# Patient Record
Sex: MALE | Race: WHITE | NOT HISPANIC OR LATINO | URBAN - METROPOLITAN AREA
[De-identification: names, ages, dates, MRNs, and addresses within clinical notes are randomized per-mention and may not be internally consistent; named-entity substitution may affect disease eponyms.]

---

## 2021-08-09 ENCOUNTER — OUTPATIENT (OUTPATIENT)
Dept: OUTPATIENT SERVICES | Facility: HOSPITAL | Age: 71
LOS: 1 days | Discharge: HOME | End: 2021-08-09

## 2021-08-09 DIAGNOSIS — Z11.59 ENCOUNTER FOR SCREENING FOR OTHER VIRAL DISEASES: ICD-10-CM

## 2021-08-12 ENCOUNTER — OUTPATIENT (OUTPATIENT)
Dept: OUTPATIENT SERVICES | Facility: HOSPITAL | Age: 71
LOS: 1 days | Discharge: HOME | End: 2021-08-12

## 2021-08-12 DIAGNOSIS — Z95.1 PRESENCE OF AORTOCORONARY BYPASS GRAFT: Chronic | ICD-10-CM

## 2021-08-12 DIAGNOSIS — Z86.69 PERSONAL HISTORY OF OTHER DISEASES OF THE NERVOUS SYSTEM AND SENSE ORGANS: Chronic | ICD-10-CM

## 2021-08-12 LAB
ANION GAP SERPL CALC-SCNC: 13 MMOL/L — SIGNIFICANT CHANGE UP (ref 7–14)
BUN SERPL-MCNC: 29 MG/DL — HIGH (ref 10–20)
CALCIUM SERPL-MCNC: 10.1 MG/DL — SIGNIFICANT CHANGE UP (ref 8.5–10.1)
CHLORIDE SERPL-SCNC: 103 MMOL/L — SIGNIFICANT CHANGE UP (ref 98–110)
CO2 SERPL-SCNC: 26 MMOL/L — SIGNIFICANT CHANGE UP (ref 17–32)
CREAT SERPL-MCNC: 1.3 MG/DL — SIGNIFICANT CHANGE UP (ref 0.7–1.5)
GLUCOSE BLDC GLUCOMTR-MCNC: 125 MG/DL — HIGH (ref 70–99)
GLUCOSE SERPL-MCNC: 128 MG/DL — HIGH (ref 70–99)
HCT VFR BLD CALC: 38.6 % — LOW (ref 42–52)
HGB BLD-MCNC: 12.4 G/DL — LOW (ref 14–18)
MCHC RBC-ENTMCNC: 27.6 PG — SIGNIFICANT CHANGE UP (ref 27–31)
MCHC RBC-ENTMCNC: 32.1 G/DL — SIGNIFICANT CHANGE UP (ref 32–37)
MCV RBC AUTO: 86 FL — SIGNIFICANT CHANGE UP (ref 80–94)
NRBC # BLD: 0 /100 WBCS — SIGNIFICANT CHANGE UP (ref 0–0)
PLATELET # BLD AUTO: 195 K/UL — SIGNIFICANT CHANGE UP (ref 130–400)
POTASSIUM SERPL-MCNC: 4.4 MMOL/L — SIGNIFICANT CHANGE UP (ref 3.5–5)
POTASSIUM SERPL-SCNC: 4.4 MMOL/L — SIGNIFICANT CHANGE UP (ref 3.5–5)
RBC # BLD: 4.49 M/UL — LOW (ref 4.7–6.1)
RBC # FLD: 16.3 % — HIGH (ref 11.5–14.5)
SODIUM SERPL-SCNC: 142 MMOL/L — SIGNIFICANT CHANGE UP (ref 135–146)
WBC # BLD: 5.96 K/UL — SIGNIFICANT CHANGE UP (ref 4.8–10.8)
WBC # FLD AUTO: 5.96 K/UL — SIGNIFICANT CHANGE UP (ref 4.8–10.8)

## 2021-08-12 NOTE — ASU PATIENT PROFILE, ADULT - NSICDXPASTMEDICALHX_GEN_ALL_CORE_FT
PAST MEDICAL HISTORY:  Hypertension Diabetes   CABG  Tricia Neuropathy  Hyperlipidemia   Hypertension  Myocardial Infarction

## 2021-08-12 NOTE — CHART NOTE - NSCHARTNOTEFT_GEN_A_CORE
Preliminary Cardiac Catheterization Post-Procedure Report    PRE-OP DIAGNOSIS: abnormal stress test    PROCEDURE: Coronary angiogram, graft angiography    Attending: Dr. Rosario  Fellow: Dr. Drummond, Dr. Taylor     ANESTHESIA TYPE  [  ]General Anesthesia  [ x ] Sedation  [ x ] Local/Regional    ESTIMATED BLOOD LOSS:   < 10 mL    CONDITION  [  ] Critical  [  ] Serious  [  ]Fair  [ x ]Good    ACCESS & HEMOSTASIS  [  ] Right radial   [  ] Right femoral  [ x ] Left radial ->  Dstat  [  ] Left femoral       FINDINGS    Coronary circulation: There was significant 3-vessel coronary artery disease. Left main: Angiography showed mild atherosclerosis with no flow limiting lesions. Proximal LAD: Angiography showed severe atherosclerosis. Mid LAD: There was a 100 % stenosis. This lesion is a chronic total occlusion. Distal LAD: Angiography showed mild atherosclerosis with no flow limiting lesions. The artery was supplied by a patent LIMA graft. Patent prior stent distal to the anastomosis. 1st diagonal: Angiography showed mild atherosclerosis with no flow limiting lesions. The artery was supplied by retrograde flow from LIMA graft. Proximal circumflex: There was a 100 % stenosis at the ostium of the vessel segment. This lesion is a chronic total occlusion. 1st obtuse marginal: The artery was supplied by a patent bypass graft. Patent vessel distal to the graft anastomosis. Proximal RCA: There was a 100 % stenosis. This lesion is a chronic total occlusion. Graft to the LAD: The graft was a large sized SIMMONS. Graft angiography showed no evidence of disease. Graft to the 1st obtuse marginal: The graft was a large sized saphenous vein graft from the ascending aorta. Graft angiography showed no evidence of disease.     PROCEDURE SUMMARY  There is significant triple vessel coronary artery disease.  Patent LIMA graft to LAD. Patent prior stent in LAD distal to the LIMA anastomosis.  Patent SVG to OM.       RECOMMENDATIONS    -IV hydration post procedure   -Aggressive medical therapy and risk factor modification  -Out patient follow up with cardiology

## 2021-08-12 NOTE — H&P CARDIOLOGY - NSICDXPASTMEDICALHX_GEN_ALL_CORE_FT
PAST MEDICAL HISTORY:  CAD (coronary artery disease) s/p PCI x1 2012, MI x 2 (pt unsure of date)    DM (diabetes mellitus)     Hypertension Diabetes   CABG  Tricia Neuropathy  Hyperlipidemia   Hypertension  Myocardial Infarction    Liver cirrhosis

## 2021-08-12 NOTE — H&P CARDIOLOGY - HISTORY OF PRESENT ILLNESS
Patient is a 71y Male PMH: HTN, HLD, MI x 2 (pt unsure of date)DM, CAD s/p  PCI x 1 2012, DM, neuropathy, stent L leg ,                                   PSH: CABG x 2 7/2003(SIMMONS-LAD), carpal tunnel sx , knee sx   Pt reports SOB and CP with activity over past few months, had abnormal nuclear stress test revealing inferolateral defect, [facility] recommended     Vital Signs Last 24 Hrs  T(C): --  T(F): --  HR: --73  BP: --133/64  BP(mean): --89  RR: --  SpO2: --94% RA    Pre cath note:  indication:  [ ] STEMI                [ ] NSTEMI                 [ ] Acute coronary syndrome                   [ ]Unstable Angina   [ ] high risk  [ ] intermediate risk  [ ] low risk                   [ x] Stable Angina     non-invasive testing:         nuc stress test                 Date:      7/23/21               result: [ ] high risk  [ x] intermediate risk  [ ] low risk    Anti- Anginal medications:                    [ ] not used                       [x ] used                   [ ] not used but strong indication not to use    Ejection Fraction                   [ ] <29            [ ] 30-39%   [ ] 40-49%     [ ]>50%    CHF                   [ ] active (within last 14 days on meds   [ ] Chronic (on meds but no exacerbation)    COPD                   [ ] mild (on chronic bronchodilators)  [ ] moderate (on chronic steroid therapy)      [ ] severe (indication for home O2 or PACO2 >50)    Other risk factors:                     [ x] Previous MI                     [ ] CVA/ stroke                    [ ] carotid stent/ CEA                    [ ] PVD/PAD- (arterial aneurysm, non-palpable pulses, tortuous vessel with inability to insert catheter, infra-renal dissection, renal or subclavian artery stenosis)                    [x ] diabetic                    [ ] previous CABG                    [ ] Renal Failure     Bleeding Risk: 1.2%                          12.4   5.96  )-----------( 195      ( 12 Aug 2021 07:30 )             38.6             REVIEW OF SYSTEMS:  CONSTITUTIONAL: No fever, weight loss, or fatigue  CARDIOLOGY: PAtient denies chest pain, shortness of breath or syncopal episodes.   RESPIRATORY: denies shortness of breath, wheezing  NEUROLOGICAL: NO weakness, no focal deficits to report.  ENDOCRINOLOGICAL: no recent change in diabetic medications.   GI: no BRBPR, no N,V,diarrhea.     PHYSICAL EXAM:  · CONSTITUTIONAL:	Well-developed, well nourished    ·RESPIRATORY:   airway patent; breath sounds equal; good air movement; respirations non-labored; clear to auscultation bilaterally; no chest wall tenderness; no intercostal retractions; no rales,rhonchi or wheeze  · CARDIOVASCULAR	regular rate and rhythm  no rub  no murmur  normal PMI  · EXTREMITIES: No cyanosis, clubbing or edema, B/l toe ulcers(dressing C/D/I)  · VASCULAR: 	Equal and normal pulses (carotid, femoral, dorsalis pedis)  	        EKG: S. Stefan

## 2021-08-12 NOTE — H&P CARDIOLOGY - NSICDXPASTSURGICALHX_GEN_ALL_CORE_FT
PAST SURGICAL HISTORY:  H/O carpal tunnel syndrome Knee Surgery    S/P CABG x 2 7/2003 @ [facility](LIMA-LAD, SVG to RCA)

## 2021-08-17 DIAGNOSIS — E78.5 HYPERLIPIDEMIA, UNSPECIFIED: ICD-10-CM

## 2021-08-17 DIAGNOSIS — R94.39 ABNORMAL RESULT OF OTHER CARDIOVASCULAR FUNCTION STUDY: ICD-10-CM

## 2021-08-17 DIAGNOSIS — I25.2 OLD MYOCARDIAL INFARCTION: ICD-10-CM

## 2021-08-17 DIAGNOSIS — I25.10 ATHEROSCLEROTIC HEART DISEASE OF NATIVE CORONARY ARTERY WITHOUT ANGINA PECTORIS: ICD-10-CM

## 2021-08-17 DIAGNOSIS — I10 ESSENTIAL (PRIMARY) HYPERTENSION: ICD-10-CM

## 2021-08-17 DIAGNOSIS — E11.40 TYPE 2 DIABETES MELLITUS WITH DIABETIC NEUROPATHY, UNSPECIFIED: ICD-10-CM

## 2021-08-31 PROBLEM — I10 ESSENTIAL (PRIMARY) HYPERTENSION: Chronic | Status: ACTIVE | Noted: 2021-08-12

## 2021-08-31 PROBLEM — K74.60 UNSPECIFIED CIRRHOSIS OF LIVER: Chronic | Status: ACTIVE | Noted: 2021-08-12

## 2021-08-31 PROBLEM — E11.9 TYPE 2 DIABETES MELLITUS WITHOUT COMPLICATIONS: Chronic | Status: ACTIVE | Noted: 2021-08-12

## 2021-08-31 PROBLEM — I25.10 ATHEROSCLEROTIC HEART DISEASE OF NATIVE CORONARY ARTERY WITHOUT ANGINA PECTORIS: Chronic | Status: ACTIVE | Noted: 2021-08-12

## 2021-09-08 PROBLEM — Z00.00 ENCOUNTER FOR PREVENTIVE HEALTH EXAMINATION: Status: ACTIVE | Noted: 2021-09-08

## 2021-09-10 ENCOUNTER — APPOINTMENT (OUTPATIENT)
Dept: CARDIOLOGY | Facility: CLINIC | Age: 71
End: 2021-09-10
Payer: MEDICARE

## 2021-09-10 VITALS
TEMPERATURE: 96.8 F | DIASTOLIC BLOOD PRESSURE: 80 MMHG | WEIGHT: 203 LBS | RESPIRATION RATE: 16 BRPM | HEART RATE: 65 BPM | SYSTOLIC BLOOD PRESSURE: 130 MMHG | OXYGEN SATURATION: 95 % | BODY MASS INDEX: 27.5 KG/M2 | HEIGHT: 72 IN

## 2021-09-10 DIAGNOSIS — F17.200 NICOTINE DEPENDENCE, UNSPECIFIED, UNCOMPLICATED: ICD-10-CM

## 2021-09-10 DIAGNOSIS — Z83.3 FAMILY HISTORY OF DIABETES MELLITUS: ICD-10-CM

## 2021-09-10 DIAGNOSIS — Z86.79 PERSONAL HISTORY OF OTHER DISEASES OF THE CIRCULATORY SYSTEM: ICD-10-CM

## 2021-09-10 PROCEDURE — 99204 OFFICE O/P NEW MOD 45 MIN: CPT

## 2021-09-10 PROCEDURE — 93925 LOWER EXTREMITY STUDY: CPT

## 2021-09-10 RX ORDER — OXYCODONE 18 MG/1
18 CAPSULE, EXTENDED RELEASE ORAL
Refills: 0 | Status: ACTIVE | COMMUNITY

## 2021-09-10 RX ORDER — OMEPRAZOLE 20 MG/1
20 CAPSULE, DELAYED RELEASE ORAL DAILY
Refills: 0 | Status: ACTIVE | COMMUNITY

## 2021-09-10 RX ORDER — ASPIRIN ENTERIC COATED TABLETS 81 MG 81 MG/1
81 TABLET, DELAYED RELEASE ORAL DAILY
Refills: 0 | Status: ACTIVE | COMMUNITY

## 2021-09-10 RX ORDER — RANOLAZINE 500 MG/1
500 TABLET, FILM COATED, EXTENDED RELEASE ORAL
Refills: 0 | Status: ACTIVE | COMMUNITY

## 2021-09-10 RX ORDER — OXYBUTYNIN CHLORIDE 10 MG/1
10 TABLET, EXTENDED RELEASE ORAL DAILY
Refills: 0 | Status: ACTIVE | COMMUNITY

## 2021-09-10 RX ORDER — OXYCODONE 5 MG/1
5 TABLET ORAL
Refills: 0 | Status: ACTIVE | COMMUNITY

## 2021-09-10 RX ORDER — NALOXEGOL OXALATE 25 MG/1
25 TABLET, FILM COATED ORAL DAILY
Refills: 0 | Status: ACTIVE | COMMUNITY

## 2021-09-10 RX ORDER — CHROMIUM 200 MCG
TABLET ORAL DAILY
Refills: 0 | Status: ACTIVE | COMMUNITY

## 2021-09-10 NOTE — ASSESSMENT
[FreeTextEntry1] : 70 y/o M with h/o DM, HTN and CAD (CABG) presents with c/o b/l calf 'stabbing sensation x many years' also reports b/l hallux calluses with ulcerations that have been present for 'many years' \par Denies CP, SOB or palpitations.  \par \par \par Plan:\par - LEELA/PVR \par - LE arterial duplex \par - F/u post studies

## 2021-09-10 NOTE — REASON FOR VISIT
[FreeTextEntry1] : Initial visit for 70 y/o M with h/o DM, HTN and CAD (CABG) presents with c/o b/l calf 'stabbing sensation x many years' also reports b/l hallux calluses with ulcerations that have been present for 'many years' Denies CP, SOB or palpitations.

## 2021-09-17 ENCOUNTER — APPOINTMENT (OUTPATIENT)
Dept: CARDIOLOGY | Facility: CLINIC | Age: 71
End: 2021-09-17
Payer: MEDICARE

## 2021-09-17 PROCEDURE — 93923 UPR/LXTR ART STDY 3+ LVLS: CPT

## 2021-09-24 ENCOUNTER — APPOINTMENT (OUTPATIENT)
Dept: CARDIOLOGY | Facility: CLINIC | Age: 71
End: 2021-09-24
Payer: MEDICARE

## 2021-09-24 VITALS
WEIGHT: 19.2 LBS | TEMPERATURE: 97.5 F | DIASTOLIC BLOOD PRESSURE: 80 MMHG | HEART RATE: 60 BPM | HEIGHT: 72 IN | SYSTOLIC BLOOD PRESSURE: 130 MMHG | OXYGEN SATURATION: 97 % | BODY MASS INDEX: 2.6 KG/M2

## 2021-09-24 PROCEDURE — 99214 OFFICE O/P EST MOD 30 MIN: CPT

## 2021-09-24 NOTE — REASON FOR VISIT
[FreeTextEntry1] : 70 y/o M with h/o DM, HTN and CAD (CABG) initially presented with c/o b/l calf 'stabbing sensation x many years' also reports b/l hallux calluses with ulcerations that have been present for 'many years' Denies CP, SOB or palpitations. Now for f/u post vascular studies, reports no improvement in his Sx or ulcers.

## 2021-09-24 NOTE — PHYSICAL EXAM
[General Appearance - Well Developed] : well developed [Normal Conjunctiva] : the conjunctiva exhibited no abnormalities [Normal Oral Mucosa] : normal oral mucosa [Heart Sounds] : normal S1 and S2 [2+] : left 2+ [1+] : right 1+ [0] : left 0 [Abdomen Soft] : soft [Abnormal Walk] : normal gait [Nail Clubbing] : no clubbing of the fingernails [FreeTextEntry1] : b/l hallex ulcer on planter surface  [] : no rash [Oriented To Time, Place, And Person] : oriented to person, place, and time

## 2021-09-24 NOTE — ASSESSMENT
[FreeTextEntry1] : 70 y/o M with h/o DM, HTN and CAD (CABG) \par c/o b/l calf 'stabbing sensation x many years' also reports b/l hallux calluses with ulcerations that have been present for 'many years' \par \par Arterial duplex with calcified non- obstructive disease above the knees \par LEELA/PVR - Right foot disease \par \par \par Plan:\par - Continue DPM f/u for offloading custom insoles \par - Dx angio. offered for PAD evaluation \par - Continue risk factor mod.\par - Pre procedural labs  Detail Level: Detailed Depth Of Biopsy: dermis Was A Bandage Applied: Yes Size Of Lesion In Cm: 0 Biopsy Type: H and E Biopsy Method: Personna blade Anesthesia Type: 1% lidocaine without epinephrine Hemostasis: Aluminum Chloride Wound Care: Vaseline Dressing: bandage Destruction After The Procedure: No Type Of Destruction Used: Curettage Cryotherapy Text: The wound bed was treated with cryotherapy after the biopsy was performed. Electrodesiccation Text: The wound bed was treated with electrodesiccation after the biopsy was performed. Electrodesiccation And Curettage Text: The wound bed was treated with electrodesiccation and curettage after the biopsy was performed. Silver Nitrate Text: The wound bed was treated with silver nitrate after the biopsy was performed. [place]: [facility] [place] [facility] [place] [facility] Lab Facility: [address] Consent: Written consent was obtained and risks were reviewed including but not limited to scarring, infection, bleeding, scabbing, incomplete removal, nerve damage and allergy to anesthesia. Post-Care Instructions: I reviewed with the patient in detail post-care instructions. Patient is to keep the biopsy site dry overnight, and then apply bacitracin twice daily until healed. Patient may apply hydrogen peroxide soaks to remove any crusting. Notification Instructions: Patient will be notified of biopsy results. However, patient instructed to call the office if not contacted within 2 weeks. Billing Type: [employer] Information: Selecting Yes will display possible errors in your note based on the variables you have selected. This validation is only offered as a suggestion for you. PLEASE NOTE THAT THE VALIDATION TEXT WILL BE REMOVED WHEN YOU FINALIZE YOUR NOTE. IF YOU WANT TO FAX A PRELIMINARY NOTE YOU WILL NEED TO TOGGLE THIS TO 'NO' IF YOU DO NOT WANT IT IN YOUR FAXED NOTE.

## 2021-10-18 ENCOUNTER — LABORATORY RESULT (OUTPATIENT)
Age: 71
End: 2021-10-18

## 2021-10-18 ENCOUNTER — OUTPATIENT (OUTPATIENT)
Dept: OUTPATIENT SERVICES | Facility: HOSPITAL | Age: 71
LOS: 1 days | Discharge: HOME | End: 2021-10-18

## 2021-10-18 DIAGNOSIS — Z86.69 PERSONAL HISTORY OF OTHER DISEASES OF THE NERVOUS SYSTEM AND SENSE ORGANS: Chronic | ICD-10-CM

## 2021-10-18 DIAGNOSIS — Z11.59 ENCOUNTER FOR SCREENING FOR OTHER VIRAL DISEASES: ICD-10-CM

## 2021-10-18 DIAGNOSIS — Z95.1 PRESENCE OF AORTOCORONARY BYPASS GRAFT: Chronic | ICD-10-CM

## 2021-10-21 ENCOUNTER — OUTPATIENT (OUTPATIENT)
Dept: OUTPATIENT SERVICES | Facility: HOSPITAL | Age: 71
LOS: 1 days | Discharge: HOME | End: 2021-10-21
Payer: MEDICARE

## 2021-10-21 VITALS
DIASTOLIC BLOOD PRESSURE: 71 MMHG | SYSTOLIC BLOOD PRESSURE: 131 MMHG | TEMPERATURE: 98 F | OXYGEN SATURATION: 98 % | HEART RATE: 70 BPM | RESPIRATION RATE: 16 BRPM

## 2021-10-21 DIAGNOSIS — Z86.69 PERSONAL HISTORY OF OTHER DISEASES OF THE NERVOUS SYSTEM AND SENSE ORGANS: Chronic | ICD-10-CM

## 2021-10-21 DIAGNOSIS — Z95.1 PRESENCE OF AORTOCORONARY BYPASS GRAFT: Chronic | ICD-10-CM

## 2021-10-21 LAB
ANION GAP SERPL CALC-SCNC: 15 MMOL/L — HIGH (ref 7–14)
BUN SERPL-MCNC: 31 MG/DL — HIGH (ref 10–20)
CALCIUM SERPL-MCNC: 10 MG/DL — SIGNIFICANT CHANGE UP (ref 8.5–10.1)
CHLORIDE SERPL-SCNC: 105 MMOL/L — SIGNIFICANT CHANGE UP (ref 98–110)
CO2 SERPL-SCNC: 22 MMOL/L — SIGNIFICANT CHANGE UP (ref 17–32)
CREAT SERPL-MCNC: 1.1 MG/DL — SIGNIFICANT CHANGE UP (ref 0.7–1.5)
GLUCOSE SERPL-MCNC: 120 MG/DL — HIGH (ref 70–99)
HCT VFR BLD CALC: 38 % — LOW (ref 42–52)
HGB BLD-MCNC: 12.2 G/DL — LOW (ref 14–18)
MCHC RBC-ENTMCNC: 28 PG — SIGNIFICANT CHANGE UP (ref 27–31)
MCHC RBC-ENTMCNC: 32.1 G/DL — SIGNIFICANT CHANGE UP (ref 32–37)
MCV RBC AUTO: 87.2 FL — SIGNIFICANT CHANGE UP (ref 80–94)
NRBC # BLD: 0 /100 WBCS — SIGNIFICANT CHANGE UP (ref 0–0)
PLATELET # BLD AUTO: 205 K/UL — SIGNIFICANT CHANGE UP (ref 130–400)
POTASSIUM SERPL-MCNC: 4.6 MMOL/L — SIGNIFICANT CHANGE UP (ref 3.5–5)
POTASSIUM SERPL-SCNC: 4.6 MMOL/L — SIGNIFICANT CHANGE UP (ref 3.5–5)
RBC # BLD: 4.36 M/UL — LOW (ref 4.7–6.1)
RBC # FLD: 15.9 % — HIGH (ref 11.5–14.5)
SODIUM SERPL-SCNC: 142 MMOL/L — SIGNIFICANT CHANGE UP (ref 135–146)
WBC # BLD: 7.63 K/UL — SIGNIFICANT CHANGE UP (ref 4.8–10.8)
WBC # FLD AUTO: 7.63 K/UL — SIGNIFICANT CHANGE UP (ref 4.8–10.8)

## 2021-10-21 PROCEDURE — 36246 INS CATH ABD/L-EXT ART 2ND: CPT | Mod: 59

## 2021-10-21 PROCEDURE — 75716 ARTERY X-RAYS ARMS/LEGS: CPT | Mod: 26,XU

## 2021-10-21 PROCEDURE — 75625 CONTRAST EXAM ABDOMINL AORTA: CPT | Mod: 26,XU

## 2021-10-21 RX ORDER — GABAPENTIN 400 MG/1
200 CAPSULE ORAL
Qty: 0 | Refills: 0 | DISCHARGE

## 2021-10-21 RX ORDER — LISINOPRIL 2.5 MG/1
1 TABLET ORAL
Qty: 0 | Refills: 0 | DISCHARGE

## 2021-10-21 NOTE — CHART NOTE - NSCHARTNOTEFT_GEN_A_CORE
INDICATION:                           CLI bilateral abnormal LE duplex, bilateral hallux ulcers with calf pain                           Dickenson class V     PHYSICIAN: Dr Guido  ASSISTANT/FELLOW: Claudia     ACCESS: Ultrasound Guided right radial artery access    VASCULAR CLOSURE DEVICE (if applicable): TR band    ANGIOGRAM:  Selective Abdominal Aorta, Right Iliac, Right SFA, Right Lower Extremity, Left Iliac, Left SFA, Left Lower Extremity DSA angiography     DETAILED PROCEDURE SUMMARY:  After obtaining informed consent, the patient was brought to the catheterization suite in a post- absorptive and non-sedated state. After this, a timeout was performed and I administered moderate sedation throughout this procedure. An independent trained observer pushed medications at my direction, and monitored the patient’s level of consciousness and physiological status throughout. The patient was prepped and draped in the usual sterile manner. 1% lidocaine was used for local anesthesia and the patient was sedated as per endovascular lab protocol. Access was obtained in the  Femoral Artery using the modified Seldinger technique  5 Italian Sheath was placed in the artery under fluoroscopy and ultrasound guidance which was utilized for assessment of arterial patency and actual real-time visualization of needle passage to the arterial lumen.     CONTRAST: 100  ml    DIAGNOSTIC FINDINGS    Abdominal Aorta: patent   Right Common Iliac Artery: Patent   Right External Iliac Artery: patent   Right Common Femoral Artery: patent   Right SFA Artery: patent calcified moderate disease   Right Profunda Artery: 70% lesion   Right Popliteal Artery: moderate disease   Right Tibial Peroneal Trunk Artery:  Reft Anterior Tibial Artery: occluded with flow reconstitution distally at foot level via collaterals from PT   Right Peroneal Artery: occluded with collateral from PT.   Right Posterior Tibial Artery: patent       Left Common Iliac Artery: patent   Left External Iliac Artery: patent   Left Common Femoral Artery: patent   Left SFA Artery: patent   Left Profunda Artery: patent   Left Popliteal Artery: 75% lesion   Left Tibial Peroneal Trunk Artery: patent    Left Anterior Tibial Artery: occluded, with flow reconstitution distally via collaterls  Left Peroneal Artery: patent   Left Posterior Tibial Artery: patent         INTERVENTIONS( if applicable): none     COMPLICATION: none     PERIPHERAL DIAGNOSTIC ANGIOGRAM/INTERVENTION SUMMARY:  -Right Profunda: 70% Lesion   -Single vessel run off distally to right foot via PT supplying collateral to AT and peroneal  -Left popliteal : 75% lesion   -Left occluded AT.       RECOMMENDATIONs:  DC home later today if stable   IV Hydration monitor kidney function   Antiplatelets: ASA , Plavix   Aggressive risk factor modification  will for PTA later likely via anterograde and retrograde approach

## 2021-10-21 NOTE — H&P CARDIOLOGY - HISTORY OF PRESENT ILLNESS
Patient is a 71y Male who presents to the cardiology department for Peripheral angiogram.     Charlie Class: 5        SUBJ:  70 y/o male presents for Diagnostic Peripheral angiogram. Pt has B/l hallux ulcers x 7 years. Pt also endorses B/L Calf cramping.        PAST MEDICAL HISTORY:  CAD (coronary artery disease) s/p PCI x1 2012, MI x 2 (pt unsure of date)  DM (diabetes mellitus)   Hypertension Diabetes   CABG  Tricia Neuropathy  Hyperlipidemia   Hypertension  Myocardial Infarction    Liver cirrhosis.     PAST SURGICAL HISTORY:  H/O carpal tunnel syndrome   Knee Surgery  S/P CABG x 2 7/2003 @ [facility](LIMA-LAD, SVG to RCA).  PCI to LLE    REVIEW OF SYSTEMS:  CONSTITUTIONAL: No fever, weight loss, or fatigue  CARDIOLOGY: Patient denies chest pain, shortness of breath or syncopal episodes.   RESPIRATORY: denies shortness of breath, wheezing.   NEUROLOGICAL: NO weakness, no focal deficits to report.  GI: no BRBPR, no N,V, diarrhea.     PHYSICAL EXAM:  · CONSTITUTIONAL:	Well-developed, well nourished     · RESPIRATORY:   airway patent; breath sounds equal; good air movement; respirations non-labored; clear to auscultation bilaterally; no chest wall tenderness; no intercostal retractions; no rales,rhonchi or wheeze  · CARDIOVASCULAR	regular rate and rhythm  no rub  no murmur    · EXTREMITIES: No cyanosis, clubbing or edema, B/L Hallux ulcerations   · VASCULAR: 	  	Pulses:  +Radial,    LEFT PT/DP +  ,      RIGHT PT/DP +

## 2021-10-21 NOTE — ASU PATIENT PROFILE, ADULT - NSICDXPASTSURGICALHX_GEN_ALL_CORE_FT
PAST SURGICAL HISTORY:  H/O carpal tunnel syndrome Knee Surgery    S/P CABG x 2 7/2003 @ [facility](LIMA-LAD, SVG to RCA)

## 2021-10-25 LAB — GLUCOSE BLDC GLUCOMTR-MCNC: 117 MG/DL — HIGH (ref 70–99)

## 2021-10-29 DIAGNOSIS — Z95.1 PRESENCE OF AORTOCORONARY BYPASS GRAFT: ICD-10-CM

## 2021-10-29 DIAGNOSIS — E78.00 PURE HYPERCHOLESTEROLEMIA, UNSPECIFIED: ICD-10-CM

## 2021-10-29 DIAGNOSIS — I25.10 ATHEROSCLEROTIC HEART DISEASE OF NATIVE CORONARY ARTERY WITHOUT ANGINA PECTORIS: ICD-10-CM

## 2021-10-29 DIAGNOSIS — I10 ESSENTIAL (PRIMARY) HYPERTENSION: ICD-10-CM

## 2021-10-29 DIAGNOSIS — Z87.891 PERSONAL HISTORY OF NICOTINE DEPENDENCE: ICD-10-CM

## 2021-10-29 DIAGNOSIS — E11.51 TYPE 2 DIABETES MELLITUS WITH DIABETIC PERIPHERAL ANGIOPATHY WITHOUT GANGRENE: ICD-10-CM

## 2021-10-29 DIAGNOSIS — I99.8 OTHER DISORDER OF CIRCULATORY SYSTEM: ICD-10-CM

## 2021-10-29 DIAGNOSIS — I70.25 ATHEROSCLEROSIS OF NATIVE ARTERIES OF OTHER EXTREMITIES WITH ULCERATION: ICD-10-CM

## 2021-10-29 DIAGNOSIS — Z79.02 LONG TERM (CURRENT) USE OF ANTITHROMBOTICS/ANTIPLATELETS: ICD-10-CM

## 2021-10-29 DIAGNOSIS — Z79.82 LONG TERM (CURRENT) USE OF ASPIRIN: ICD-10-CM

## 2021-11-05 ENCOUNTER — APPOINTMENT (OUTPATIENT)
Dept: CARDIOLOGY | Facility: CLINIC | Age: 71
End: 2021-11-05
Payer: MEDICARE

## 2021-11-05 VITALS
BODY MASS INDEX: 27.09 KG/M2 | TEMPERATURE: 97.9 F | HEIGHT: 72 IN | DIASTOLIC BLOOD PRESSURE: 80 MMHG | RESPIRATION RATE: 16 BRPM | OXYGEN SATURATION: 94 % | WEIGHT: 200 LBS | SYSTOLIC BLOOD PRESSURE: 128 MMHG | HEART RATE: 69 BPM

## 2021-11-05 DIAGNOSIS — I20.9 ANGINA PECTORIS, UNSPECIFIED: ICD-10-CM

## 2021-11-05 PROCEDURE — 99214 OFFICE O/P EST MOD 30 MIN: CPT

## 2021-12-13 ENCOUNTER — LABORATORY RESULT (OUTPATIENT)
Age: 71
End: 2021-12-13

## 2021-12-16 ENCOUNTER — OUTPATIENT (OUTPATIENT)
Dept: OUTPATIENT SERVICES | Facility: HOSPITAL | Age: 71
LOS: 1 days | Discharge: HOME | End: 2021-12-16

## 2021-12-16 ENCOUNTER — INPATIENT (INPATIENT)
Facility: HOSPITAL | Age: 71
LOS: 0 days | Discharge: HOME | End: 2021-12-17
Attending: STUDENT IN AN ORGANIZED HEALTH CARE EDUCATION/TRAINING PROGRAM | Admitting: STUDENT IN AN ORGANIZED HEALTH CARE EDUCATION/TRAINING PROGRAM
Payer: MEDICARE

## 2021-12-16 VITALS
HEART RATE: 58 BPM | RESPIRATION RATE: 18 BRPM | DIASTOLIC BLOOD PRESSURE: 78 MMHG | SYSTOLIC BLOOD PRESSURE: 150 MMHG | WEIGHT: 199.52 LBS | TEMPERATURE: 97 F

## 2021-12-16 DIAGNOSIS — Z95.5 PRESENCE OF CORONARY ANGIOPLASTY IMPLANT AND GRAFT: ICD-10-CM

## 2021-12-16 DIAGNOSIS — I25.2 OLD MYOCARDIAL INFARCTION: ICD-10-CM

## 2021-12-16 DIAGNOSIS — Z79.02 LONG TERM (CURRENT) USE OF ANTITHROMBOTICS/ANTIPLATELETS: ICD-10-CM

## 2021-12-16 DIAGNOSIS — Z86.69 PERSONAL HISTORY OF OTHER DISEASES OF THE NERVOUS SYSTEM AND SENSE ORGANS: Chronic | ICD-10-CM

## 2021-12-16 DIAGNOSIS — Z87.891 PERSONAL HISTORY OF NICOTINE DEPENDENCE: ICD-10-CM

## 2021-12-16 DIAGNOSIS — I70.201 UNSPECIFIED ATHEROSCLEROSIS OF NATIVE ARTERIES OF EXTREMITIES, RIGHT LEG: ICD-10-CM

## 2021-12-16 DIAGNOSIS — Z95.1 PRESENCE OF AORTOCORONARY BYPASS GRAFT: ICD-10-CM

## 2021-12-16 DIAGNOSIS — E11.51 TYPE 2 DIABETES MELLITUS WITH DIABETIC PERIPHERAL ANGIOPATHY WITHOUT GANGRENE: ICD-10-CM

## 2021-12-16 DIAGNOSIS — I70.92 CHRONIC TOTAL OCCLUSION OF ARTERY OF THE EXTREMITIES: ICD-10-CM

## 2021-12-16 DIAGNOSIS — K74.60 UNSPECIFIED CIRRHOSIS OF LIVER: ICD-10-CM

## 2021-12-16 DIAGNOSIS — Z95.1 PRESENCE OF AORTOCORONARY BYPASS GRAFT: Chronic | ICD-10-CM

## 2021-12-16 DIAGNOSIS — Z97.4 PRESENCE OF EXTERNAL HEARING-AID: ICD-10-CM

## 2021-12-16 DIAGNOSIS — Z79.4 LONG TERM (CURRENT) USE OF INSULIN: ICD-10-CM

## 2021-12-16 DIAGNOSIS — I25.10 ATHEROSCLEROTIC HEART DISEASE OF NATIVE CORONARY ARTERY WITHOUT ANGINA PECTORIS: ICD-10-CM

## 2021-12-16 DIAGNOSIS — E11.42 TYPE 2 DIABETES MELLITUS WITH DIABETIC POLYNEUROPATHY: ICD-10-CM

## 2021-12-16 DIAGNOSIS — Z79.82 LONG TERM (CURRENT) USE OF ASPIRIN: ICD-10-CM

## 2021-12-16 DIAGNOSIS — I10 ESSENTIAL (PRIMARY) HYPERTENSION: ICD-10-CM

## 2021-12-16 DIAGNOSIS — E78.5 HYPERLIPIDEMIA, UNSPECIFIED: ICD-10-CM

## 2021-12-16 DIAGNOSIS — R31.9 HEMATURIA, UNSPECIFIED: ICD-10-CM

## 2021-12-16 LAB
ANION GAP SERPL CALC-SCNC: 19 MMOL/L — HIGH (ref 7–14)
BUN SERPL-MCNC: 41 MG/DL — HIGH (ref 10–20)
CALCIUM SERPL-MCNC: 10 MG/DL — SIGNIFICANT CHANGE UP (ref 8.5–10.1)
CHLORIDE SERPL-SCNC: 105 MMOL/L — SIGNIFICANT CHANGE UP (ref 98–110)
CO2 SERPL-SCNC: 20 MMOL/L — SIGNIFICANT CHANGE UP (ref 17–32)
CREAT SERPL-MCNC: 1.3 MG/DL — SIGNIFICANT CHANGE UP (ref 0.7–1.5)
GLUCOSE BLDC GLUCOMTR-MCNC: 88 MG/DL — SIGNIFICANT CHANGE UP (ref 70–99)
GLUCOSE SERPL-MCNC: 107 MG/DL — HIGH (ref 70–99)
HCT VFR BLD CALC: 36 % — LOW (ref 42–52)
HGB BLD-MCNC: 11.6 G/DL — LOW (ref 14–18)
MCHC RBC-ENTMCNC: 27.7 PG — SIGNIFICANT CHANGE UP (ref 27–31)
MCHC RBC-ENTMCNC: 32.2 G/DL — SIGNIFICANT CHANGE UP (ref 32–37)
MCV RBC AUTO: 85.9 FL — SIGNIFICANT CHANGE UP (ref 80–94)
NRBC # BLD: 0 /100 WBCS — SIGNIFICANT CHANGE UP (ref 0–0)
PLATELET # BLD AUTO: 193 K/UL — SIGNIFICANT CHANGE UP (ref 130–400)
POTASSIUM SERPL-MCNC: 3.9 MMOL/L — SIGNIFICANT CHANGE UP (ref 3.5–5)
POTASSIUM SERPL-SCNC: 3.9 MMOL/L — SIGNIFICANT CHANGE UP (ref 3.5–5)
RBC # BLD: 4.19 M/UL — LOW (ref 4.7–6.1)
RBC # FLD: 15 % — HIGH (ref 11.5–14.5)
SODIUM SERPL-SCNC: 144 MMOL/L — SIGNIFICANT CHANGE UP (ref 135–146)
WBC # BLD: 7.62 K/UL — SIGNIFICANT CHANGE UP (ref 4.8–10.8)
WBC # FLD AUTO: 7.62 K/UL — SIGNIFICANT CHANGE UP (ref 4.8–10.8)

## 2021-12-16 PROCEDURE — 93010 ELECTROCARDIOGRAM REPORT: CPT

## 2021-12-16 PROCEDURE — 75710 ARTERY X-RAYS ARM/LEG: CPT | Mod: 26,XU

## 2021-12-16 PROCEDURE — 37230: CPT | Mod: RT

## 2021-12-16 PROCEDURE — 36246 INS CATH ABD/L-EXT ART 2ND: CPT | Mod: 59

## 2021-12-16 RX ORDER — LISINOPRIL 2.5 MG/1
5 TABLET ORAL DAILY
Refills: 0 | Status: DISCONTINUED | OUTPATIENT
Start: 2021-12-16 | End: 2021-12-17

## 2021-12-16 RX ORDER — ISOSORBIDE MONONITRATE 60 MG/1
30 TABLET, EXTENDED RELEASE ORAL DAILY
Refills: 0 | Status: DISCONTINUED | OUTPATIENT
Start: 2021-12-16 | End: 2021-12-17

## 2021-12-16 RX ORDER — ASPIRIN/CALCIUM CARB/MAGNESIUM 324 MG
81 TABLET ORAL DAILY
Refills: 0 | Status: DISCONTINUED | OUTPATIENT
Start: 2021-12-17 | End: 2021-12-17

## 2021-12-16 RX ORDER — RANOLAZINE 500 MG/1
500 TABLET, FILM COATED, EXTENDED RELEASE ORAL
Refills: 0 | Status: DISCONTINUED | OUTPATIENT
Start: 2021-12-16 | End: 2021-12-17

## 2021-12-16 RX ORDER — SODIUM CHLORIDE 9 MG/ML
1000 INJECTION INTRAMUSCULAR; INTRAVENOUS; SUBCUTANEOUS
Refills: 0 | Status: DISCONTINUED | OUTPATIENT
Start: 2021-12-16 | End: 2021-12-17

## 2021-12-16 RX ORDER — ATORVASTATIN CALCIUM 80 MG/1
10 TABLET, FILM COATED ORAL AT BEDTIME
Refills: 0 | Status: DISCONTINUED | OUTPATIENT
Start: 2021-12-16 | End: 2021-12-17

## 2021-12-16 RX ORDER — OXYBUTYNIN CHLORIDE 5 MG
10 TABLET ORAL DAILY
Refills: 0 | Status: DISCONTINUED | OUTPATIENT
Start: 2021-12-17 | End: 2021-12-17

## 2021-12-16 RX ORDER — ISOSORBIDE MONONITRATE 60 MG/1
40 TABLET, EXTENDED RELEASE ORAL DAILY
Refills: 0 | Status: DISCONTINUED | OUTPATIENT
Start: 2021-12-16 | End: 2021-12-16

## 2021-12-16 RX ORDER — FOLIC ACID 0.8 MG
1 TABLET ORAL DAILY
Refills: 0 | Status: DISCONTINUED | OUTPATIENT
Start: 2021-12-16 | End: 2021-12-17

## 2021-12-16 RX ORDER — DULOXETINE HYDROCHLORIDE 30 MG/1
60 CAPSULE, DELAYED RELEASE ORAL DAILY
Refills: 0 | Status: DISCONTINUED | OUTPATIENT
Start: 2021-12-16 | End: 2021-12-17

## 2021-12-16 RX ORDER — OXYCODONE HYDROCHLORIDE 5 MG/1
5 TABLET ORAL EVERY 8 HOURS
Refills: 0 | Status: DISCONTINUED | OUTPATIENT
Start: 2021-12-16 | End: 2021-12-17

## 2021-12-16 RX ORDER — OXYCODONE HYDROCHLORIDE 5 MG/1
15 TABLET ORAL EVERY 12 HOURS
Refills: 0 | Status: DISCONTINUED | OUTPATIENT
Start: 2021-12-16 | End: 2021-12-16

## 2021-12-16 RX ORDER — LANOLIN ALCOHOL/MO/W.PET/CERES
5 CREAM (GRAM) TOPICAL AT BEDTIME
Refills: 0 | Status: DISCONTINUED | OUTPATIENT
Start: 2021-12-16 | End: 2021-12-17

## 2021-12-16 RX ORDER — NALOXEGOL OXALATE 12.5 MG/1
25 TABLET, FILM COATED ORAL
Refills: 0 | Status: DISCONTINUED | OUTPATIENT
Start: 2021-12-17 | End: 2021-12-17

## 2021-12-16 RX ORDER — TAMSULOSIN HYDROCHLORIDE 0.4 MG/1
0.4 CAPSULE ORAL AT BEDTIME
Refills: 0 | Status: DISCONTINUED | OUTPATIENT
Start: 2021-12-16 | End: 2021-12-17

## 2021-12-16 RX ORDER — CLOPIDOGREL BISULFATE 75 MG/1
75 TABLET, FILM COATED ORAL DAILY
Refills: 0 | Status: DISCONTINUED | OUTPATIENT
Start: 2021-12-17 | End: 2021-12-17

## 2021-12-16 RX ORDER — OXYCODONE HYDROCHLORIDE 5 MG/1
10 TABLET ORAL EVERY 12 HOURS
Refills: 0 | Status: DISCONTINUED | OUTPATIENT
Start: 2021-12-16 | End: 2021-12-17

## 2021-12-16 RX ORDER — LANOLIN ALCOHOL/MO/W.PET/CERES
5 CREAM (GRAM) TOPICAL ONCE
Refills: 0 | Status: COMPLETED | OUTPATIENT
Start: 2021-12-16 | End: 2021-12-16

## 2021-12-16 RX ORDER — PANTOPRAZOLE SODIUM 20 MG/1
40 TABLET, DELAYED RELEASE ORAL
Refills: 0 | Status: DISCONTINUED | OUTPATIENT
Start: 2021-12-16 | End: 2021-12-17

## 2021-12-16 RX ADMIN — RANOLAZINE 500 MILLIGRAM(S): 500 TABLET, FILM COATED, EXTENDED RELEASE ORAL at 17:29

## 2021-12-16 RX ADMIN — Medication 5 MILLIGRAM(S): at 22:47

## 2021-12-16 RX ADMIN — PANTOPRAZOLE SODIUM 40 MILLIGRAM(S): 20 TABLET, DELAYED RELEASE ORAL at 13:56

## 2021-12-16 RX ADMIN — ATORVASTATIN CALCIUM 10 MILLIGRAM(S): 80 TABLET, FILM COATED ORAL at 22:47

## 2021-12-16 RX ADMIN — DULOXETINE HYDROCHLORIDE 60 MILLIGRAM(S): 30 CAPSULE, DELAYED RELEASE ORAL at 13:56

## 2021-12-16 RX ADMIN — SODIUM CHLORIDE 100 MILLILITER(S): 9 INJECTION INTRAMUSCULAR; INTRAVENOUS; SUBCUTANEOUS at 12:00

## 2021-12-16 RX ADMIN — ISOSORBIDE MONONITRATE 30 MILLIGRAM(S): 60 TABLET, EXTENDED RELEASE ORAL at 13:56

## 2021-12-16 RX ADMIN — TAMSULOSIN HYDROCHLORIDE 0.4 MILLIGRAM(S): 0.4 CAPSULE ORAL at 22:47

## 2021-12-16 RX ADMIN — OXYCODONE HYDROCHLORIDE 10 MILLIGRAM(S): 5 TABLET ORAL at 19:02

## 2021-12-16 NOTE — DISCHARGE NOTE NURSING/CASE MANAGEMENT/SOCIAL WORK - PATIENT PORTAL LINK FT
You can access the FollowMyHealth Patient Portal offered by [facility] by registering at the following website: [email or web address]. By joining [facility]’s FollowMyHealth portal, you will also be able to view your health information using other applications (apps) compatible with our system.

## 2021-12-16 NOTE — DISCHARGE NOTE NURSING/CASE MANAGEMENT/SOCIAL WORK - NSDCPEFALRISK_GEN_ALL_CORE
For information on Fall & Injury Prevention, visit: [email or web address] OR  [email or web address] OR  https://www.cdc.gov/steadi/patient.html

## 2021-12-16 NOTE — ASU PATIENT PROFILE, ADULT - FALL HARM RISK - UNIVERSAL INTERVENTIONS
Bed in lowest position, wheels locked, appropriate side rails in place/Call bell, personal items and telephone in reach/Instruct patient to call for assistance before getting out of bed or chair/Non-slip footwear when patient is out of bed/[place] to call system/Physically safe environment - no spills, clutter or unnecessary equipment/Purposeful Proactive Rounding/Room/bathroom lighting operational, light cord in reach

## 2021-12-16 NOTE — ASU PATIENT PROFILE, ADULT - NSICDXPASTSURGICALHX_GEN_ALL_CORE_FT
PAST SURGICAL HISTORY:  H/O carpal tunnel syndrome Knee Surgery    S/P CABG x 2 7/2003 @ [facility](LIMA-LAD, SVG to RCA)

## 2021-12-16 NOTE — H&P CARDIOLOGY - NSICDXPASTSURGICALHX_GEN_ALL_CORE_FT
PAST SURGICAL HISTORY:  H/O carpal tunnel syndrome Knee Surgery    S/P CABG x 2 7/2003 @ [facility](LIMA-LAD, SVG to RCA)

## 2021-12-16 NOTE — PATIENT PROFILE ADULT - FALL HARM RISK - HARM RISK INTERVENTIONS
Assistance with ambulation/Assistance OOB with selected safe patient handling equipment/Communicate Risk of Fall with Harm to all staff/Discuss with provider need for PT consult/Monitor gait and stability/Provide patient with walking aids - walker, cane, crutches/Reinforce activity limits and safety measures with patient and family/Sit up slowly, dangle for a short time, stand at bedside before walking/Tailored Fall Risk Interventions/Use of alarms - bed, chair and/or voice tab/Visual Cue: Yellow wristband and red socks/Bed in lowest position, wheels locked, appropriate side rails in place/Call bell, personal items and telephone in reach/Instruct patient to call for assistance before getting out of bed or chair/Non-slip footwear when patient is out of bed/Joy to call system/Physically safe environment - no spills, clutter or unnecessary equipment/Purposeful Proactive Rounding/Room/bathroom lighting operational, light cord in reach

## 2021-12-16 NOTE — CHART NOTE - NSCHARTNOTEFT_GEN_A_CORE
INDICATION:   CLI  Charlie class V    PHYSICIAN: Dr. Guido  ASSISTANT/FELLOW:    ACCESS: Ultrasound Guided  femoral artery access    VASCULAR CLOSURE DEVICE (if applicable):    ANGIOGRAM:  R iliofemoral, Lower Extremity DSA angiography     DETAILED PROCEDURE SUMMARY:  After obtaining informed consent, the patient was brought to the catheterization suite in a post- absorptive and non-sedated state. After this, a timeout was performed and I administered moderate sedation throughout this procedure. An independent trained observer pushed medications at my direction, and monitored the patient’s level of consciousness and physiological status throughout. The patient was prepped and draped in the usual sterile manner. 2% lidocaine was used for local anesthesia and the patient was sedated as per endovascular lab protocol. Access was obtained in the  Femoral Artery using the modified Seldinger technique Sheath was placed in the artery under fluoroscopy and ultrasound guidance which was utilized for assessment of arterial patency and actual real-time visualization of needle passage to the arterial lumen.       DIAGNOSTIC FINDINGS    Right Common Iliac Artery: Patent   Right Common Femoral Artery: Patent  Right SFA Artery: Patent  Right Profunda Artery:  Right Popliteal Artery: 70% stenosis  Right Anterior Tibial Artery: Occluded      INTERVENTIONS( if applicable): Peripheral Stent. Balloon angioplasty   3.5 X 48 Synergy XD drug-eluting stent to Prox AT  4.0 X 48 Synergy XD drug-eluting stent  to prox AT  4.0 X 48 Synergy XD drug-eluting stent to prox AT  1.5 - 4.5mm x 6mm Tack Endovascular System stent    COMPLICATION: none    Specimens removed: N/A     RECOMMENDATIONs:  Tele monitoing overnight  monitor access site  post procedure IV hydration  cont medical therapy and risk factor modification

## 2021-12-16 NOTE — H&P CARDIOLOGY - HISTORY OF PRESENT ILLNESS
72 y/o male PMH: PAD Lleg stent, DM, HTN, CAD s/p PCI x 1 2012                     PSH: CABG x 2 7/2003 x2 at [facility]   Pt has B/l hallux ulcers(b/l dressings in place) x 7 years. Pt also endorses B/L Calf cramping and b/l foot and leg pain, s/p diagnostic angiogram 10/21   PERIPHERAL DIAGNOSTIC ANGIOGRAM/INTERVENTION SUMMARY:  -Right Profunda: 70% Lesion   -Single vessel run off distally to right foot via PT supplying collateral to AT and peroneal  -Left popliteal : 75% lesion   -Left occluded AT.       RECOMMENDATIONs:  DC home later today if stable   IV Hydration monitor kidney function   Antiplatelets: ASA , Plavix   Aggressive risk factor modification  will for PTA later likely via anterograde and retrograde approach.      Electronic Signatures:  Carlos Taylor)   (Signed 21-Oct-2021 11:39)  	Authored: Note Type, Note  Shreyas Guido)   (Signed 21-Nov-2021 14:44)  	Co-Signer: Note Type, Note          PAST MEDICAL HISTORY:  CAD (coronary artery disease) s/p PCI x1 2012, MI x 2 (pt unsure of date)  DM (diabetes mellitus)   Hypertension Diabetes   CABG  Tricia Neuropathy  Hyperlipidemia   Hypertension  Myocardial Infarction    Liver cirrhosis.     PAST SURGICAL HISTORY:  H/O carpal tunnel syndrome   Knee Surgery  S/P CABG x 2 7/2003 @ [facility](LIMA-LAD, SVG to RCA).  PCI to LLE    REVIEW OF SYSTEMS:  CONSTITUTIONAL: No fever, weight loss, or fatigue  CARDIOLOGY: Patient denies chest pain, shortness of breath or syncopal episodes.   RESPIRATORY: denies shortness of breath, wheezing.   NEUROLOGICAL: NO weakness, no focal deficits to report.  GI: no BRBPR, no N,V, diarrhea.     PHYSICAL EXAM:  · CONSTITUTIONAL:	Well-developed, well nourished     · RESPIRATORY:   airway patent; breath sounds equal; good air movement; respirations non-labored; clear to auscultation bilaterally; no chest wall tenderness; no intercostal retractions; no rales,rhonchi or wheeze  · CARDIOVASCULAR	regular rate and rhythm  no rub  no murmur    · EXTREMITIES: No cyanosis, clubbing or edema, B/L Hallux ulcerations dressing in place  · VASCULAR: 	  	Pulses:  +Radial,    LEFT PT/DP + 1 ,      RIGHT PT/DP + 1

## 2021-12-17 ENCOUNTER — TRANSCRIPTION ENCOUNTER (OUTPATIENT)
Age: 71
End: 2021-12-17

## 2021-12-17 VITALS — WEIGHT: 199.3 LBS

## 2021-12-17 DIAGNOSIS — R52 PAIN, UNSPECIFIED: ICD-10-CM

## 2021-12-17 DIAGNOSIS — E11.9 TYPE 2 DIABETES MELLITUS WITHOUT COMPLICATIONS: ICD-10-CM

## 2021-12-17 DIAGNOSIS — I25.10 ATHEROSCLEROTIC HEART DISEASE OF NATIVE CORONARY ARTERY WITHOUT ANGINA PECTORIS: ICD-10-CM

## 2021-12-17 DIAGNOSIS — I10 ESSENTIAL (PRIMARY) HYPERTENSION: ICD-10-CM

## 2021-12-17 LAB
GLUCOSE BLDC GLUCOMTR-MCNC: 111 MG/DL — HIGH (ref 70–99)
GLUCOSE BLDC GLUCOMTR-MCNC: 117 MG/DL — HIGH (ref 70–99)
GLUCOSE BLDC GLUCOMTR-MCNC: 138 MG/DL — HIGH (ref 70–99)
MAGNESIUM SERPL-MCNC: 1.7 MG/DL — LOW (ref 1.8–2.4)

## 2021-12-17 PROCEDURE — 99239 HOSP IP/OBS DSCHRG MGMT >30: CPT

## 2021-12-17 RX ORDER — MAGNESIUM OXIDE 400 MG ORAL TABLET 241.3 MG
400 TABLET ORAL ONCE
Refills: 0 | Status: COMPLETED | OUTPATIENT
Start: 2021-12-17 | End: 2021-12-17

## 2021-12-17 RX ORDER — ASPIRIN/CALCIUM CARB/MAGNESIUM 324 MG
0 TABLET ORAL
Qty: 0 | Refills: 0 | DISCHARGE

## 2021-12-17 RX ORDER — MAGNESIUM OXIDE 400 MG ORAL TABLET 241.3 MG
1 TABLET ORAL
Qty: 0 | Refills: 0 | DISCHARGE

## 2021-12-17 RX ORDER — ASPIRIN/CALCIUM CARB/MAGNESIUM 324 MG
1 TABLET ORAL
Qty: 0 | Refills: 0 | DISCHARGE

## 2021-12-17 RX ADMIN — Medication 200 MILLIGRAM(S): at 05:50

## 2021-12-17 RX ADMIN — ISOSORBIDE MONONITRATE 30 MILLIGRAM(S): 60 TABLET, EXTENDED RELEASE ORAL at 11:53

## 2021-12-17 RX ADMIN — CLOPIDOGREL BISULFATE 75 MILLIGRAM(S): 75 TABLET, FILM COATED ORAL at 11:52

## 2021-12-17 RX ADMIN — OXYCODONE HYDROCHLORIDE 10 MILLIGRAM(S): 5 TABLET ORAL at 06:19

## 2021-12-17 RX ADMIN — DULOXETINE HYDROCHLORIDE 60 MILLIGRAM(S): 30 CAPSULE, DELAYED RELEASE ORAL at 11:52

## 2021-12-17 RX ADMIN — LISINOPRIL 5 MILLIGRAM(S): 2.5 TABLET ORAL at 05:51

## 2021-12-17 RX ADMIN — RANOLAZINE 500 MILLIGRAM(S): 500 TABLET, FILM COATED, EXTENDED RELEASE ORAL at 05:51

## 2021-12-17 RX ADMIN — MAGNESIUM OXIDE 400 MG ORAL TABLET 400 MILLIGRAM(S): 241.3 TABLET ORAL at 11:52

## 2021-12-17 RX ADMIN — OXYCODONE HYDROCHLORIDE 10 MILLIGRAM(S): 5 TABLET ORAL at 05:49

## 2021-12-17 RX ADMIN — Medication 10 MILLIGRAM(S): at 11:52

## 2021-12-17 RX ADMIN — Medication 81 MILLIGRAM(S): at 11:53

## 2021-12-17 RX ADMIN — NALOXEGOL OXALATE 25 MILLIGRAM(S): 12.5 TABLET, FILM COATED ORAL at 05:50

## 2021-12-17 RX ADMIN — Medication 1 MILLIGRAM(S): at 11:52

## 2021-12-17 RX ADMIN — PANTOPRAZOLE SODIUM 40 MILLIGRAM(S): 20 TABLET, DELAYED RELEASE ORAL at 05:51

## 2021-12-17 NOTE — PROGRESS NOTE ADULT - SUBJECTIVE AND OBJECTIVE BOX
Vascular Cardiology Follow up    SOHEILA FOX   71y Male  PAST MEDICAL & SURGICAL HISTORY:  Hypertension  Diabetes   CABG  Tricia Neuropathy  Hyperlipidemia   Hypertension  Myocardial Infarction    CAD (coronary artery disease)  s/p PCI x1 , MI x 2 (pt unsure of date)    Liver cirrhosis    DM (diabetes mellitus)    H/O carpal tunnel syndrome  Knee Surgery    S/P CABG x 2  2003 @ [facility](LIMA-LAD, SVG to RCA)         HPI:    70 y/o male PMH: PAD Lleg stent, DM, HTN, CAD s/p PCI x 1                      PSH: CABG x 2 7/2003 x2 at [facility]   Pt has B/l hallux ulcers(b/l dressings in place) x 7 years. Pt also endorses B/L Calf cramping and b/l foot and leg pain, s/p diagnostic angiogram 10/21   PERIPHERAL DIAGNOSTIC ANGIOGRAM/INTERVENTION SUMMARY:  -Right Profunda: 70% Lesion   -Single vessel run off distally to right foot via PT supplying collateral to AT and peroneal  -Left popliteal : 75% lesion   -Left occluded AT.       RECOMMENDATIONs:  DC home later today if stable   IV Hydration monitor kidney function   Antiplatelets: ASA , Plavix   Aggressive risk factor modification  will for PTA later likely via anterograde and retrograde approach.      Electronic Signatures:  Carlos Taylor)   (Signed 21-Oct-2021 11:39)  	Authored: Note Type, Note  Shreyas Guido)   (Signed 2021 14:44)  	Co-Signer: Note Type, Note          PAST MEDICAL HISTORY:  CAD (coronary artery disease) s/p PCI x1 , MI x 2 (pt unsure of date)  DM (diabetes mellitus)   Hypertension Diabetes   CABG  Tricia Neuropathy  Hyperlipidemia   Hypertension  Myocardial Infarction    Liver cirrhosis.     PAST SURGICAL HISTORY:  H/O carpal tunnel syndrome   Knee Surgery  S/P CABG x 2 2003 @ [facility](LIMA-LAD, SVG to RCA).  PCI to LLE    REVIEW OF SYSTEMS:  CONSTITUTIONAL: No fever, weight loss, or fatigue  CARDIOLOGY: Patient denies chest pain, shortness of breath or syncopal episodes.   RESPIRATORY: denies shortness of breath, wheezing.   NEUROLOGICAL: NO weakness, no focal deficits to report.  GI: no BRBPR, no N,V, diarrhea.     PHYSICAL EXAM:  · CONSTITUTIONAL:	Well-developed, well nourished     · RESPIRATORY:   airway patent; breath sounds equal; good air movement; respirations non-labored; clear to auscultation bilaterally; no chest wall tenderness; no intercostal retractions; no rales,rhonchi or wheeze  · CARDIOVASCULAR	regular rate and rhythm  no rub  no murmur    · EXTREMITIES: No cyanosis, clubbing or edema, B/L Hallux ulcerations dressing in place  · VASCULAR: 	  	Pulses:  +Radial,    LEFT PT/DP + 1 ,      RIGHT PT/DP + 1       (16 Dec 2021 07:13)    Allergies    No Known Allergies      Patient examined at bedside.   Patient without complaints. Pt ambulated without issues/symptoms  Denies LE pain and swelling,  SOB, palpitations, or dizziness  No events on telemetry overnight    Vital Signs Last 24 Hrs  T(C): 36.6 (17 Dec 2021 05:21), Max: 36.6 (16 Dec 2021 20:05)  T(F): 97.8 (17 Dec 2021 05:21), Max: 97.9 (16 Dec 2021 20:05)  HR: 69 (17 Dec 2021 05:21) (50 - 69)  BP: 154/79 (17 Dec 2021 05:21) (118/66 - 154/79)  BP(mean): 103 (17 Dec 2021 03:00) (103 - 103)  RR: 18 (17 Dec 2021 05:21) (18 - 18)  SpO2: 97% (16 Dec 2021 17:42) (97% - 97%)    MEDICATIONS  (STANDING):  aspirin  chewable 81 milliGRAM(s) Oral daily  atorvastatin 10 milliGRAM(s) Oral at bedtime  clopidogrel Tablet 75 milliGRAM(s) Oral daily  DULoxetine 60 milliGRAM(s) Oral daily  folic acid 1 milliGRAM(s) Oral daily  isosorbide   mononitrate ER Tablet (IMDUR) 30 milliGRAM(s) Oral daily  lisinopril 5 milliGRAM(s) Oral daily  melatonin 5 milliGRAM(s) Oral at bedtime  naloxegol 25 milliGRAM(s) Oral before breakfast  oxybutynin 10 milliGRAM(s) Oral daily  oxyCODONE  ER Tablet 10 milliGRAM(s) Oral every 12 hours  pantoprazole    Tablet 40 milliGRAM(s) Oral before breakfast  pregabalin 200 milliGRAM(s) Oral two times a day  ranolazine 500 milliGRAM(s) Oral two times a day  sodium chloride 0.9%. 1000 milliLiter(s) (100 mL/Hr) IV Continuous <Continuous>  tamsulosin 0.4 milliGRAM(s) Oral at bedtime    MEDICATIONS  (PRN):  oxyCODONE    IR 5 milliGRAM(s) Oral every 8 hours PRN Moderate Pain (4 - 6)      REVIEW OF SYSTEMS:          All negative except as mentioned in HPI    PHYSICAL EXAM:           CONSTITUTIONAL: Well-developed; well-nourished; in no acute distress  	SKIN: warm, dry  	HEAD: Normocephalic; atraumatic  	EYES: PERRL.  	ENT: No nasal discharge, airway clear, mucous membranes moist  	NECK: Supple; non tender.  	CARD: +S1, +S2, no murmurs, gallops, or rubs. Regular rate and rhythm    	RESP: No wheezes, rales or rhonchi. CTA B/L  	ABD: soft ntnd, + BS x 4 quadrants  	EXT: moves all extremities,  no clubbing, cyanosis or edema  	NEURO: Alert and oriented x3, no focal deficits          PSYCH: Cooperative, appropriate          VASCULAR:  + Rad / + PTs / + DPs          EXTREMITY:             Right Groin:  Dressing D/C/I, pressure dressing removed, access site soft, no hematoma, no pain, + pulses, no sign of infection, no numbness  	            ECD ECHO:    LABS:                        11.6   7.62  )-----------( 193      ( 16 Dec 2021 06:40 )             36.0         144  |  105  |  41<H>  ----------------------------<  107<H>  3.9   |  20  |  1.3    Ca    10.0      16 Dec 2021 06:40  Mg     1.7               Magnesium, Serum: 1.7 mg/dL *L* [1.8 - 2.4] (21 @ 06:11)      A/P:  I discussed the case with Cardiologist    and recommend the following:    S/P PTA:      	     Continue DAPT ( Aspirin 81 mg PO Daily and Plavix 75 mg daily  ),  B-Blocker, Statin Therapy                   Patient given 30 day supply of ( Aspirin 81 mg daily and Plavix 75 mg daily ) to take at home                   Monitor access site/distal pulses                   No strenuous activity for 3 days (routine walking okay)                   No driving for 3 days                   No heavy lifting > 30lbs for 2 weeks                   May shower in 24 hours                   Leave dressing in place 24- 48 hours, if does not fall off in 48 hours can remove                   Patient agreeing to take DAPT as directed by cardiologist                    Post angiogram instructions, access site care and activity restrictions reviewed with patient                     Discussed with patient to return to hospital if experience severe lower extremity pain and site bleeding                   Aggressive risk factor modification, diet counseling, smoking cessation discussed with patient                      Can discharge patient from vascular standpoint ambulating without symptoms, access site wnl and labs reviewed                    Follow up with Cardiology Dr. Guido  in two weeks.  Instructed to call and make an appointment                                       Vascular Cardiology Follow up    SOHIELA FOX   71y Male  PAST MEDICAL & SURGICAL HISTORY:  Hypertension  Diabetes   CABG  Tricia Neuropathy  Hyperlipidemia   Hypertension  Myocardial Infarction    CAD (coronary artery disease)  s/p PCI x1 2012, MI x 2 (pt unsure of date)    Liver cirrhosis    DM (diabetes mellitus)    H/O carpal tunnel syndrome  Knee Surgery    S/P CABG x 2  7/2003 @ [facility](LIMA-LAD, SVG to RCA)         HPI:    70 y/o male PMH: PAD Lleg stent, DM, HTN, CAD s/p PCI x 1 2012                     PSH: CABG x 2 7/2003 x2 at [facility]   Pt has B/l hallux ulcers(b/l dressings in place) x 7 years. Pt also endorses B/L Calf cramping and b/l foot and leg pain, s/p diagnostic angiogram 10/21   PERIPHERAL DIAGNOSTIC ANGIOGRAM/INTERVENTION SUMMARY:  -Right Profunda: 70% Lesion   -Single vessel run off distally to right foot via PT supplying collateral to AT and peroneal  -Left popliteal : 75% lesion   -Left occluded AT.       RECOMMENDATIONs:  DC home later today if stable   IV Hydration monitor kidney function   Antiplatelets: ASA , Plavix   Aggressive risk factor modification  will for PTA later likely via anterograde and retrograde approach.      Electronic Signatures:  Carlos Taylor)   (Signed 21-Oct-2021 11:39)  	Authored: Note Type, Note  Shreyas Guido)   (Signed 21-Nov-2021 14:44)  	Co-Signer: Note Type, Note          PAST MEDICAL HISTORY:  CAD (coronary artery disease) s/p PCI x1 2012, MI x 2 (pt unsure of date)  DM (diabetes mellitus)   Hypertension Diabetes   CABG  Tricia Neuropathy  Hyperlipidemia   Hypertension  Myocardial Infarction    Liver cirrhosis.     PAST SURGICAL HISTORY:  H/O carpal tunnel syndrome   Knee Surgery  S/P CABG x 2 7/2003 @ [facility](LIMA-LAD, SVG to RCA).  PCI to LLE       (16 Dec 2021 07:13)    Allergies    No Known Allergies      Patient examined at bedside.   Patient without complaints. Pt OOB to chair, ambulated without issues/symptoms  Denies LE pain and swelling,  SOB, palpitations, or dizziness  No events on telemetry overnight    Vital Signs Last 24 Hrs  T(C): 36.6 (17 Dec 2021 05:21), Max: 36.6 (16 Dec 2021 20:05)  T(F): 97.8 (17 Dec 2021 05:21), Max: 97.9 (16 Dec 2021 20:05)  HR: 69 (17 Dec 2021 05:21) (50 - 69)  BP: 154/79 (17 Dec 2021 05:21) (118/66 - 154/79)  BP(mean): 103 (17 Dec 2021 03:00) (103 - 103)  RR: 18 (17 Dec 2021 05:21) (18 - 18)  SpO2: 97% (16 Dec 2021 17:42) (97% - 97%)    MEDICATIONS  (STANDING):  aspirin  chewable 81 milliGRAM(s) Oral daily  atorvastatin 10 milliGRAM(s) Oral at bedtime  clopidogrel Tablet 75 milliGRAM(s) Oral daily  DULoxetine 60 milliGRAM(s) Oral daily  folic acid 1 milliGRAM(s) Oral daily  isosorbide   mononitrate ER Tablet (IMDUR) 30 milliGRAM(s) Oral daily  lisinopril 5 milliGRAM(s) Oral daily  melatonin 5 milliGRAM(s) Oral at bedtime  naloxegol 25 milliGRAM(s) Oral before breakfast  oxybutynin 10 milliGRAM(s) Oral daily  oxyCODONE  ER Tablet 10 milliGRAM(s) Oral every 12 hours  pantoprazole    Tablet 40 milliGRAM(s) Oral before breakfast  pregabalin 200 milliGRAM(s) Oral two times a day  ranolazine 500 milliGRAM(s) Oral two times a day  sodium chloride 0.9%. 1000 milliLiter(s) (100 mL/Hr) IV Continuous <Continuous>  tamsulosin 0.4 milliGRAM(s) Oral at bedtime    MEDICATIONS  (PRN):  oxyCODONE    IR 5 milliGRAM(s) Oral every 8 hours PRN Moderate Pain (4 - 6)      REVIEW OF SYSTEMS:          All negative except as mentioned in HPI    PHYSICAL EXAM:           CONSTITUTIONAL: Well-developed; well-nourished; in no acute distress  	SKIN: warm, dry  	HEAD: Normocephalic; atraumatic  	EYES: PERRL.  	ENT: No nasal discharge, airway clear, mucous membranes moist  	NECK: Supple; non tender.  	CARD: +S1, +S2, no murmurs, gallops, or rubs. Regular rate and rhythm    	RESP: No wheezes, rales or rhonchi. CTA B/L  	ABD: soft ntnd, + BS x 4 quadrants  	EXT: moves all extremities,  no clubbing, cyanosis or edema  	NEURO: Alert and oriented x3, no focal deficits          PSYCH: Cooperative, appropriate          VASCULAR:  + Rad / + PTs / + DPs          EXTREMITY:             Right Groin:  Dressing D/C/I, mild ecchymosis , access site soft, no hematoma, no pain, + pulses, no sign of infection, no numbness  	            ECG:        < from: 12 Lead ECG (12.16.21 @ 14:51) >    Ventricular Rate 62 BPM    Atrial Rate 62 BPM    P-R Interval 154 ms    QRS Duration 108 ms    Q-T Interval 418 ms    QTC Calculation(Bazett) 424 ms    P Axis -6 degrees    R Axis -20 degrees    T Axis 55 degrees    Diagnosis Line Normal sinus rhythm with sinus arrhythmia  Normal ECG    Confirmed by BRENNON MARIE MD ([id number]) on 12/17/2021 6:11:41 AM    < end of copied text >                                                                                                           2D ECHO:    LABS:                        11.6   7.62  )-----------( 193      ( 16 Dec 2021 06:40 )             36.0     12-16    144  |  105  |  41<H>  ----------------------------<  107<H>  3.9   |  20  |  1.3    Ca    10.0      16 Dec 2021 06:40  Mg     1.7     12-17          Magnesium, Serum: 1.7 mg/dL *L* [1.8 - 2.4] (12-17-21 @ 06:11)      A/P:  I discussed the case with Cardiologist Dr. Guido  and recommend the following:    S/P PTA:                    INTERVENTIONS( if applicable): Peripheral Stent. Balloon angioplasty   3.5 X 48 Synergy XD drug-eluting stent to Prox AT  4.0 X 48 Synergy XD drug-eluting stent  to prox AT  4.0 X 48 Synergy XD drug-eluting stent to prox AT  1.5 - 4.5mm x 6mm Tack Endovascular System stent    COMPLICATION: none      	     Continue DAPT ( may continue with Aspirin 81 mg PO every other Day and resume taking Plavix 75 mg daily  ),  B-Blocker, Statin Therapy                   Patient given 30 day supply of ( Aspirin 81 mg daily and Plavix 75 mg daily ) to take at home                   Monitor access site/distal pulses                   No strenuous activity for 3 days (routine walking okay)                   No driving for 3 days                   No heavy lifting > 30lbs for 2 weeks                   May shower in 24 hours                   Leave dressing in place 24- 48 hours, if does not fall off in 48 hours can remove                   Patient agreeing to take DAPT as directed by cardiologist                    Post angiogram instructions, access site care and activity restrictions reviewed with patient                     Discussed with patient to return to hospital if experience severe lower extremity pain and site bleeding                   Aggressive risk factor modification, diet counseling, smoking cessation discussed with patient                      Can discharge patient from vascular standpoint ambulating without symptoms, access site wnl and labs reviewed                    Follow up with Cardiology Dr. Guido  in two weeks.  Instructed to call and make an appointment

## 2021-12-17 NOTE — DISCHARGE NOTE PROVIDER - HOSPITAL COURSE
Mr. Mobley is a 71yoM with CAD s/p PCI x 2 (2012) and CABG x 2 (7/2013 at [facility]), HTN, DM, and PAD with bilateral hallux ulcers x 7 years s/p diagnostic angiogram (10/2021) who underwent elective peripheral intervention on 12/16/2021. Patient with occluded right anterior tibial artery, now s/p peripheral intervention to proximal AT, see below. Patient was monitored overnight with no events. He is medically stable for discharge, without any changes in his home medications.     INTERVENTIONS( if applicable): Peripheral Stent. Balloon angioplasty  3.5 X 48 Synergy XD drug-eluting stent to Prox AT  4.0 X 48 Synergy XD drug-eluting stent  to prox AT  4.0 X 48 Synergy XD drug-eluting stent to prox AT  1.5 - 4.5mm x 6mm Tack Endovascular System stent   Mr. Mobley is a 71yoM with CAD s/p PCI x 2 (2012) and CABG x 2 (7/2013 at [facility]), HTN, DM, and PAD with bilateral hallux ulcers x 7 years s/p diagnostic angiogram (10/2021) who underwent elective peripheral intervention on 12/16/2021. Patient with occluded right anterior tibial artery, now s/p peripheral intervention to proximal AT, see below. Patient was monitored overnight. He had hematuria overnight, which has resolved. Patient has a history of hematuria with work-up showing urethritis after his prior angiogram, and he follows with Urology. Hemoglobin is stable. He is medically stable for discharge, without any changes in his home medications.     INTERVENTIONS( if applicable): Peripheral Stent. Balloon angioplasty  3.5 X 48 Synergy XD drug-eluting stent to Prox AT  4.0 X 48 Synergy XD drug-eluting stent  to prox AT  4.0 X 48 Synergy XD drug-eluting stent to prox AT  1.5 - 4.5mm x 6mm Tack Endovascular System stent

## 2021-12-17 NOTE — DISCHARGE NOTE PROVIDER - CARE PROVIDER_API CALL
Shreyas Guido (MD)  Cardiovascular Disease; Endovascular Medicine; Interventional Cardiology; Vascular Medicine  [address] [place] [address], [place] [address]  Phone: [phone]  Fax: [phone]  Established Patient  Follow Up Time: 2 weeks

## 2021-12-17 NOTE — DISCHARGE NOTE PROVIDER - NSDCMRMEDTOKEN_GEN_ALL_CORE_FT
aspirin 81 mg oral tablet: 1 tab(s) orally once a day  atorvastatin 10 mg oral tablet: 1 tab(s) orally once a day  clopidogrel 75 mg oral tablet: 1 tab(s) orally once a day  DULoxetine 60 mg oral delayed release capsule: 1 cap(s) orally once a day  isosorbide mononitrate 20 mg oral tablet: 1 tab(s) orally 2 times a day  lisinopril 5 mg oral tablet: 1 tab(s) orally once a day  magnesium oxide 400 mg oral tablet: 1 tab(s) orally once a day  Movantik 25 mg oral tablet: 1 tab(s) orally once a day (in the morning)  omeprazole 20 mg oral delayed release capsule: 1 cap(s) orally once a day  oxybutynin 10 mg/24 hr oral tablet, extended release: 1 tab(s) orally once a day  Ranexa 500 mg oral tablet, extended release: 1 tab(s) orally 2 times a day  tamsulosin 0.4 mg oral capsule: 1 cap(s) orally once a day  Xtampza ER 18 mg oral capsule, extended release: 1 cap(s) orally every 12 hours   aspirin 81 mg oral tablet: 1 tab(s) orally every other day  atorvastatin 10 mg oral tablet: 1 tab(s) orally once a day  clopidogrel 75 mg oral tablet: 1 tab(s) orally once a day  DULoxetine 60 mg oral delayed release capsule: 1 cap(s) orally once a day  isosorbide mononitrate 20 mg oral tablet: 1 tab(s) orally 2 times a day  lisinopril 5 mg oral tablet: 1 tab(s) orally once a day  magnesium oxide 400 mg oral tablet: 1 tab(s) orally once a day  Movantik 25 mg oral tablet: 1 tab(s) orally once a day (in the morning)  omeprazole 20 mg oral delayed release capsule: 1 cap(s) orally once a day  oxybutynin 10 mg/24 hr oral tablet, extended release: 1 tab(s) orally once a day  Ranexa 500 mg oral tablet, extended release: 1 tab(s) orally 2 times a day  tamsulosin 0.4 mg oral capsule: 1 cap(s) orally once a day  Xtampza ER 18 mg oral capsule, extended release: 1 cap(s) orally every 12 hours   aspirin 81 mg oral tablet: 1 tab(s) orally every other day  atorvastatin 10 mg oral tablet: 1 tab(s) orally once a day  clopidogrel 75 mg oral tablet: 1 tab(s) orally once a day  DULoxetine 60 mg oral delayed release capsule: 1 cap(s) orally once a day  isosorbide mononitrate 20 mg oral tablet: 1 tab(s) orally 2 times a day  lisinopril 5 mg oral tablet: 1 tab(s) orally once a day  Movantik 25 mg oral tablet: 1 tab(s) orally once a day (in the morning)  omeprazole 20 mg oral delayed release capsule: 1 cap(s) orally once a day  oxybutynin 10 mg/24 hr oral tablet, extended release: 1 tab(s) orally once a day  Ranexa 500 mg oral tablet, extended release: 1 tab(s) orally 2 times a day  tamsulosin 0.4 mg oral capsule: 1 cap(s) orally once a day  Xtampza ER 18 mg oral capsule, extended release: 1 cap(s) orally every 12 hours

## 2021-12-17 NOTE — DISCHARGE NOTE PROVIDER - CARE PROVIDERS DIRECT ADDRESSES
[email or web address]

## 2021-12-17 NOTE — DISCHARGE NOTE PROVIDER - NSDCFUADDINST_GEN_ALL_CORE_FT
- Please take aspirin 81 mg every OTHER day and Plavix 75 mg every day  - May shower in 24 hours  - Leave dressing in place 24- 48 hours, if does not fall off in 48 hours can remove  - No strenuous activity for 3 days (routine walking okay)  - No driving for 3 days  - No heavy lifting > 30lbs for 2 weeks

## 2021-12-17 NOTE — DISCHARGE NOTE PROVIDER - ATTENDING DISCHARGE PHYSICAL EXAMINATION:
Met with patient and wife at bedside. 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 Access sites are soft and non-tender, has bilateral bruits over the femoral arteries due to radiation from common iliac PAD.

## 2021-12-17 NOTE — DISCHARGE NOTE PROVIDER - NSDCFUSCHEDAPPT_GEN_ALL_CORE_FT
Pt called requesting an order to get the pic line removed. Pt has no more scheduled appts at the infusion center.   Call pt on Friday 11-16-17 SOHEILA FOX ; 01/[birth year]/2022 ; [facility] [address]

## 2022-01-07 ENCOUNTER — APPOINTMENT (OUTPATIENT)
Dept: CARDIOLOGY | Facility: CLINIC | Age: 72
End: 2022-01-07
Payer: MEDICARE

## 2022-01-07 VITALS
DIASTOLIC BLOOD PRESSURE: 70 MMHG | OXYGEN SATURATION: 96 % | HEIGHT: 72 IN | HEART RATE: 66 BPM | BODY MASS INDEX: 25.87 KG/M2 | WEIGHT: 191 LBS | TEMPERATURE: 98.6 F | RESPIRATION RATE: 16 BRPM | SYSTOLIC BLOOD PRESSURE: 110 MMHG

## 2022-01-07 PROBLEM — I20.9 ANGINA PECTORIS: Status: ACTIVE | Noted: 2022-01-07

## 2022-01-07 PROCEDURE — 99214 OFFICE O/P EST MOD 30 MIN: CPT

## 2022-01-07 RX ORDER — LISINOPRIL 10 MG/1
10 TABLET ORAL DAILY
Refills: 0 | Status: ACTIVE | COMMUNITY

## 2022-01-07 RX ORDER — LISINOPRIL 10 MG/1
10 TABLET ORAL
Qty: 90 | Refills: 0 | Status: DISCONTINUED | COMMUNITY
Start: 2021-08-09 | End: 2022-01-07

## 2022-01-07 RX ORDER — CHLORHEXIDINE GLUCONATE 4 %
400 LIQUID (ML) TOPICAL DAILY
Refills: 0 | Status: DISCONTINUED | COMMUNITY
End: 2022-01-07

## 2022-01-07 RX ORDER — OMEGA-3/DHA/EPA/FISH OIL 300-1000MG
400 CAPSULE ORAL
Refills: 0 | Status: DISCONTINUED | COMMUNITY
End: 2022-01-07

## 2022-01-07 RX ORDER — ISOSORBIDE DINITRATE 20 MG/1
20 TABLET ORAL DAILY
Refills: 0 | Status: DISCONTINUED | COMMUNITY
End: 2022-01-07

## 2022-01-07 RX ORDER — MOMETASONE FUROATE 1 MG/G
0.1 CREAM TOPICAL
Qty: 45 | Refills: 0 | Status: DISCONTINUED | COMMUNITY
Start: 2021-03-29 | End: 2022-01-07

## 2022-01-07 RX ORDER — PNV NO.95/FERROUS FUM/FOLIC AC 28MG-0.8MG
100 TABLET ORAL DAILY
Refills: 0 | Status: DISCONTINUED | COMMUNITY
End: 2022-01-07

## 2022-01-07 RX ORDER — TAMSULOSIN HYDROCHLORIDE 0.4 MG/1
0.4 CAPSULE ORAL DAILY
Refills: 0 | Status: DISCONTINUED | COMMUNITY
End: 2022-01-07

## 2022-01-07 RX ORDER — OMEGA-3/DHA/EPA/FISH OIL 1200 MG
1200 CAPSULE ORAL
Refills: 0 | Status: DISCONTINUED | COMMUNITY
End: 2022-01-07

## 2022-01-07 NOTE — REVIEW OF SYSTEMS
[Chest Discomfort] : chest discomfort [Wheezing] : wheezing [Joint Pain] : joint pain [Negative] : Heme/Lymph [FreeTextEntry6] : takes albuterol [FreeTextEntry9] : knee pain

## 2022-01-07 NOTE — PHYSICAL EXAM
[General Appearance - Well Developed] : well developed [Normal Conjunctiva] : the conjunctiva exhibited no abnormalities [] : no respiratory distress [Heart Rate And Rhythm] : heart rate and rhythm were normal [Abdomen Soft] : soft [Abnormal Walk] : normal gait [Skin Color & Pigmentation] : normal skin color and pigmentation [Oriented To Time, Place, And Person] : oriented to person, place, and time [FreeTextEntry1] : Diminished pulses bilateral lower extremities with varicose veins

## 2022-01-07 NOTE — ASSESSMENT
[FreeTextEntry1] : 71M with PMH of DM, HTN and CAD s/p CABG, PAD presents for follow up after Right BALTA intervention (PTA/multiple stents)\par \par PAD irvin classification 2\par b/l venous insufficiency CEAP 2\par \par A successful balloon angioplasty and multiple Synergy stents  on 100% occluded right BALTA 12/16/2021\par \par BLE Diagnostic Angio 10/2021 showed R 70% profunda stenosis, 70% deep femoral, Occluded RAT, R peroneal ; L 75% popliteal, Occluded LAT A lesion. No stents placed\par Arterial duplex 10/2021 showed No severe arterial stenosis in R extremity or L extremity\par LEELA w/PVR 09/2021 showed no significant PAD in BLE. LEELA L 0.92, R 0.95\par Coronary angio 0/2021 showed triple vessel disease. Patent graft. d/c on medical management\par NM stress 07/2021 showed reversible Inferolateral infarct\par \par Plan:\par - Continue  Plavix, ASA, statin\par - Will postpone LLE intervention till complete healing of the right hallux ulcer\par - Avoid pressure to plantar surface of big toes\par - follow with podiatrist, change dressing daily as prescribed\par \par F/U  in 1 month

## 2022-01-07 NOTE — HISTORY OF PRESENT ILLNESS
[FreeTextEntry1] : 71M with PMH of DM, HTN and CAD s/p CABG, PAD presents for follow up peripheral intervention on R BALTA on 12/16/2021\par Reports he had bilateral nonhealing hallux ulcers for 6-7 years. Was advised by podiatrist to limit exercise and therefore does not walk much. Endorses having shortness of breath and OA requiring steroid injections on R knee all of which limit his ambulation.  Patient is s/p successful intervention on right BALTA (PTA and multiple stents), reports no pain in LE with activity or at rest, denies swelling. Patient follows with podiatrist who at the last appointment  noted that right hallux ulcer decreased in size and left hallux ulcer increased in size. \par \par BLE Diagnostic Angio 10/2021 showed R 70% profunda stenosis, 70% deep femoral, Occluded RAT, R peroneal ; L 75% popliteal, Occluded LAT A lesion. No stents placed\par Arterial duplex 10/2021 showed No severe arterial stenosis in R extremity or L extremity\par LEELA w/PVR 09/2021 showed no significant PAD in BLE. LEELA L 0.92, R 0.95\par Coronary angio. 08/12/2021 showed triple vessel disease. Patent graft. d/c on medical management\par NM stress 07/2021 showed reversible Inferolateral infarct\par \par

## 2022-01-07 NOTE — ASSESSMENT
[FreeTextEntry1] : 71M with PMH of DM, HTN and CAD s/p CABG, PAD presents for follow up after diagnostic angio\par \par PAD irvin classification 2\par b/l venous insufficiency CEAP\par \par BLE Diagnostic Angio 10/2021 showed R 70% profunda stenosis, 70% deep femoral, Occluded RAT, R peroneal ; L 75% popliteal, Occluded LAT A lesion. No stents placed\par Arterial duplex 10/2021 showed No severe arterial stenosis in R extremity or L extremity\par LEELA w/PVR 09/2021 showed no significant PAD in BLE. LEELA L 0.92, R 0.95\par Coronary angio 0/2021 showed triple vessel disease. Patent graft. d/c on medical management\par NM stress 07/2021 showed reversible Inferolateral infarct\par \par Plan:\par - antiplatelets, statin, ace/arb\par - Schedule for RLE angio with possible stenting and rotar\par - preop labs\par \par RTC in 1 months

## 2022-01-07 NOTE — PHYSICAL EXAM
[General Appearance - Well Developed] : well developed [Normal Conjunctiva] : the conjunctiva exhibited no abnormalities [] : no respiratory distress [Heart Rate And Rhythm] : heart rate and rhythm were normal [Abdomen Soft] : soft [Abnormal Walk] : normal gait [Skin Color & Pigmentation] : normal skin color and pigmentation [Oriented To Time, Place, And Person] : oriented to person, place, and time [No Edema] : no edema [Normal] : no rash, no skin lesions [de-identified] : right DP/PT 1+, left DP/PT 1+, diminished popliteal and femoral pulses [FreeTextEntry1] : Diminished pulses bilateral lower extremities with varicose veins

## 2022-01-07 NOTE — HISTORY OF PRESENT ILLNESS
[FreeTextEntry1] : 71M with PMH of DM, HTN and CAD s/p CABG, PAD presents for follow up after diagnostic angio\par Reports he had bilateral nonhealing hallux ulcers for 6-7 years. Was advised by podiatrist to limit exercise and therefore does not walk much. Endorses having shortness of breathe and OA requiring steroid injections on R knee all of which limit his ambulation. \par \par BLE Diagnostic Angio 10/2021 showed R 70% profunda stenosis, 70% deep femoral, Occluded RAT, R peroneal ; L 75% popliteal, Occluded LAT A lesion. No stents placed\par Arterial duplex 10/2021 showed No severe arterial stenosis in R extremity or L extremity\par LEELA w/PVR 09/2021 showed no significant PAD in BLE. LEELA L 0.92, R 0.95\par Coronary angio 0/2021 showed triple vessel disease. Patent graft. d/c on medical management\par NM stress 07/2021 showed reversible Inferolateral infarct\par \par patient presents today for post procedure follow up, denies pain in LE with activity and at rest except for knee joints ( patient has Hx of arthritis and receives steroid injections). . Right hallux ulcer decreased in size as per podiatrist, left hallux ulcer increased in size.

## 2022-01-18 DIAGNOSIS — I70.291 OTHER ATHEROSCLEROSIS OF NATIVE ARTERIES OF EXTREMITIES, RIGHT LEG: ICD-10-CM

## 2022-01-18 DIAGNOSIS — F17.210 NICOTINE DEPENDENCE, CIGARETTES, UNCOMPLICATED: ICD-10-CM

## 2022-01-18 DIAGNOSIS — Z79.4 LONG TERM (CURRENT) USE OF INSULIN: ICD-10-CM

## 2022-01-18 DIAGNOSIS — I10 ESSENTIAL (PRIMARY) HYPERTENSION: ICD-10-CM

## 2022-01-18 DIAGNOSIS — E11.51 TYPE 2 DIABETES MELLITUS WITH DIABETIC PERIPHERAL ANGIOPATHY WITHOUT GANGRENE: ICD-10-CM

## 2022-02-04 ENCOUNTER — APPOINTMENT (OUTPATIENT)
Dept: CARDIOLOGY | Facility: CLINIC | Age: 72
End: 2022-02-04
Payer: MEDICARE

## 2022-02-04 VITALS
DIASTOLIC BLOOD PRESSURE: 64 MMHG | SYSTOLIC BLOOD PRESSURE: 114 MMHG | OXYGEN SATURATION: 94 % | HEART RATE: 73 BPM | TEMPERATURE: 98 F | BODY MASS INDEX: 25.87 KG/M2 | HEIGHT: 72 IN | WEIGHT: 191 LBS

## 2022-02-04 PROCEDURE — 99214 OFFICE O/P EST MOD 30 MIN: CPT

## 2022-02-04 RX ORDER — ATORVASTATIN CALCIUM 10 MG/1
10 TABLET, FILM COATED ORAL
Qty: 90 | Refills: 3 | Status: COMPLETED | COMMUNITY
End: 2022-02-04

## 2022-02-04 RX ORDER — CLOPIDOGREL BISULFATE 75 MG/1
75 TABLET, FILM COATED ORAL DAILY
Refills: 0 | Status: ACTIVE | COMMUNITY

## 2022-02-04 RX ORDER — DULOXETINE HYDROCHLORIDE 60 MG/1
60 CAPSULE, DELAYED RELEASE PELLETS ORAL
Refills: 0 | Status: ACTIVE | COMMUNITY
Start: 2021-08-31

## 2022-02-04 RX ORDER — PREGABALIN 200 MG/1
200 CAPSULE ORAL 3 TIMES DAILY
Refills: 0 | Status: ACTIVE | COMMUNITY

## 2022-02-04 NOTE — HISTORY OF PRESENT ILLNESS
[FreeTextEntry1] : 71M with PMH of DM, HTN and CAD s/p CABG, PAD presents for follow up peripheral intervention on R BALTA on 12/16/2021\par Reports he had bilateral nonhealing hallux ulcers for 6-7 years. Was advised by podiatrist to limit exercise and therefore does not walk much. Endorses having shortness of breath and OA requiring steroid injections on R knee all of which limit his ambulation.  Patient is s/p successful intervention on right BALTA (PTA and multiple stents), reports no pain in LE with activity or at rest, denies swelling. Patient follows with podiatrist who at the last appointment noted that right hallux ulcer decreased in size and left hallux ulcer is stable. \par \par BLE Diagnostic Angio 10/2021 showed R 70% profunda stenosis, 70% deep femoral, Occluded RAT, R peroneal ; L 75% popliteal, Occluded LAT A lesion. No stents placed\par Arterial duplex 10/2021 showed No severe arterial stenosis in R extremity or L extremity\par LEELA w/PVR 09/2021 showed no significant PAD in BLE. LEELA L 0.92, R 0.95\par Coronary angio. 08/12/2021 showed triple vessel disease. Patent graft. d/c on medical management\par NM stress 07/2021 showed reversible Inferolateral infarct.\par \par Patient also complaining of right hand weakness and thenar muscle atrophy, diagnosed with ulnar neuropathy and planned for surgery, date to be decided depending on medical clearance. \par \par

## 2022-02-04 NOTE — REASON FOR VISIT
[Symptom and Test Evaluation] : symptom and test evaluation [Other: ____] : [unfilled] [FreeTextEntry1] : Post procedure PTA/stent 12/16/2021 on right BALTA follow up

## 2022-02-04 NOTE — PHYSICAL EXAM
[No Edema] : no edema [Normal] : no rash, no skin lesions [General Appearance - Well Developed] : well developed [Normal Conjunctiva] : the conjunctiva exhibited no abnormalities [] : no respiratory distress [Heart Rate And Rhythm] : heart rate and rhythm were normal [Abdomen Soft] : soft [Abnormal Walk] : normal gait [Skin Color & Pigmentation] : normal skin color and pigmentation [Oriented To Time, Place, And Person] : oriented to person, place, and time [de-identified] : right DP/PT present, left DP/PT present, diminished popliteal and femoral pulses, acrocyanosis of both feet. [de-identified] : Right hand  weaknness, right thenar muscles atrophy [FreeTextEntry1] : Diminished pulses bilateral lower extremities with varicose veins

## 2022-02-04 NOTE — ASSESSMENT
[FreeTextEntry1] : 71M with PMH of DM, HTN and CAD s/p CABG, PAD presents for follow up after Right BALTA intervention (PTA/multiple stents)\par \par PAD irvin classification 4 s/p angioplasty\par b/l venous insufficiency CEAP 2\par \par A successful balloon angioplasty and multiple Synergy stents  on 100% occluded right BALTA 12/16/2021\par \par BLE Diagnostic Angio 10/2021 showed R 70% profunda stenosis, 70% deep femoral, Occluded RAT, R peroneal ; L 75% popliteal, Occluded LAT A lesion. No stents placed\par Arterial duplex 10/2021 showed No severe arterial stenosis in R extremity or L extremity\par LEELA w/PVR 09/2021 showed no significant PAD in BLE. LEELA L 0.92, R 0.95\par Coronary angio 0/2021 showed triple vessel disease. Patent graft. d/c on medical management\par NM stress 07/2021 showed reversible Inferolateral infarct\par \par \par Right toe ulcer less deep than prior exam, Left toe ulcer stable.\par Patient also complaining of right hand weakness and thenar muscle atrophy, diagnosed with ulnar neuropathy and planned for surgery, date to be decided depending on medical clearance. Needs to be off Plavix per hand surgery.\par \par Plan:\par - Continue  Plavix/ASA for now\par - Crestor 10 mg daily (d/c atorvastatin)\par - Will postpone LLE intervention till complete healing of the right hallux ulcer\par - Wear wool socks in cold weather for acrocyanosis (on norvasc 10 mg already)\par - Avoid pressure to plantar surface of big toes\par - Follow-up with podiatrist, change dressing daily as prescribed\par - Will decide on medical clearance for hand surgery at next visit\par \par F/U  in 3 weeks

## 2022-03-04 ENCOUNTER — APPOINTMENT (OUTPATIENT)
Dept: CARDIOLOGY | Facility: CLINIC | Age: 72
End: 2022-03-04
Payer: MEDICARE

## 2022-03-04 VITALS
HEIGHT: 72 IN | HEART RATE: 87 BPM | SYSTOLIC BLOOD PRESSURE: 116 MMHG | OXYGEN SATURATION: 94 % | TEMPERATURE: 97.1 F | WEIGHT: 200 LBS | BODY MASS INDEX: 27.09 KG/M2 | DIASTOLIC BLOOD PRESSURE: 60 MMHG

## 2022-03-04 PROCEDURE — 99214 OFFICE O/P EST MOD 30 MIN: CPT

## 2022-03-04 NOTE — REVIEW OF SYSTEMS
[Chest Discomfort] : chest discomfort [Wheezing] : wheezing [Joint Pain] : joint pain [Negative] : Heme/Lymph [FreeTextEntry6] : takes albuterol [FreeTextEntry9] : knee pain [de-identified] : BLE toes non ischemic bluish discoloration reversed with warmth  B hallux ulcers

## 2022-03-04 NOTE — PHYSICAL EXAM
[No Edema] : no edema [General Appearance - Well Developed] : well developed [Normal Conjunctiva] : the conjunctiva exhibited no abnormalities [] : no respiratory distress [Heart Rate And Rhythm] : heart rate and rhythm were normal [Abdomen Soft] : soft [Abnormal Walk] : normal gait [Skin Color & Pigmentation] : normal skin color and pigmentation [Oriented To Time, Place, And Person] : oriented to person, place, and time [Normal] : soft, non-tender, no masses/organomegaly, normal bowel sounds [de-identified] : right DP/PT present, left DP/PT present, diminished popliteal and femoral pulses, acrocyanosis of both feet. [de-identified] : R hallux ulcer + improvement in size, dry  L hallux ulcer chronic not improving  [de-identified] : Right hand  weaknness, right thenar muscles atrophy [FreeTextEntry1] : Diminished pulses bilateral lower extremities with varicose veins

## 2022-03-04 NOTE — HISTORY OF PRESENT ILLNESS
[FreeTextEntry1] : Pt is 72 year old male  with PMH of DM, HTN and CAD s/p CABG, PAD presents for follow up peripheral intervention on R BALTA on 12/16/2021\par Pt reports he had bilateral nonhealing hallux ulcers for 6-7 years. Was advised by podiatrist to limit exercise and therefore does not walk much.  Pt is not active and homebound at the moment. Endorses having shortness of breath and OA requiring steroid injections on R knee all of which limit his ambulation.  Patient is s/p successful intervention on right BALTA (PTA and multiple stents), reports no pain in LE with activity or at rest, denies swelling. Patient follows with podiatrist who at the last appointment noted that right hallux ulcer decreased in size requiaring less frequent appointments,   and left hallux ulcer is unchanged and not healing. Pt presents with advanced diabetic neuropathy.  \par BLE Diagnostic Angio 10/2021 showed R 70% profunda stenosis, 70% deep femoral, Occluded RAT, R peroneal ; L 75% popliteal, Occluded LAT A lesion. No stents placed\par Arterial duplex 10/2021 showed No severe arterial stenosis in R extremity or L extremity\par LEELA w/PVR 09/2021 showed no significant PAD in BLE. LEELA L 0.92, R 0.95\par Coronary angio. 08/12/2021 showed triple vessel disease. Patent graft. d/c on medical management\par NM stress 07/2021 showed reversible Inferolateral infarct.\par Pt stopped smoking cigarettes and uses electronic cigarettes at present time  \par \par Patient also complaining of right hand weakness and thenar muscle atrophy, diagnosed with ulnar neuropathy needs a pre -op clearance to hold anticoagulants for planned surgery \par \par

## 2022-03-04 NOTE — ASSESSMENT
[FreeTextEntry1] : 72M with PMH of DM, HTN and CAD s/p CABG, PAD presents for follow up after Right BALTA intervention (PTA/multiple stents)\par \par PAD irvin classification 4 s/p angioplasty\par b/l venous insufficiency CEAP 2\par \par A successful balloon angioplasty and multiple Synergy stents  on 100% occluded right BALTA 12/16/2021\par \par BLE Diagnostic Angio 10/2021 showed R 70% profunda stenosis, 70% deep femoral, Occluded RAT, R peroneal ; L 75% popliteal, Occluded LAT A lesion. No stents placed\par Arterial duplex 10/2021 showed No severe arterial stenosis in R extremity or L extremity\par LEELA w/PVR 09/2021 showed no significant PAD in BLE. LEELA L 0.92, R 0.95\par Coronary angio 0/2021 showed triple vessel disease. Patent graft. d/c on medical management\par NM stress 07/2021 showed reversible Inferolateral infarct\par \par \par Right hallux  toe ulcer + improvement in size in depth  healing well.   Left toe ulcer stable chronic not healing \par Pre -op clearance is given to hold ASA/Plavix x 7 days for ulnar surgery \par \par Plan:\par - Continue  Plavix/ASA for now, hold for 7 days prior to ulnar surgery \par - Crestor 10 mg daily \par - Will  schedule  LLE intervention post ulnar surgery (left SFA stenosis)\par - Wear wool socks in cold weather for acrocyanosis (on norvasc 10 mg already)\par - Pt verbalized improvement with wool socks \par - Avoid pressure to plantar surface of big toes\par - Follow-up with podiatrist, change dressing daily as prescribed\par - Medical clearance for hand surgery to hold anticoagulation x 7 days given \par

## 2022-05-06 DIAGNOSIS — Z91.89 OTHER SPECIFIED PERSONAL RISK FACTORS, NOT ELSEWHERE CLASSIFIED: ICD-10-CM

## 2022-05-24 ENCOUNTER — INPATIENT (INPATIENT)
Facility: HOSPITAL | Age: 72
LOS: 0 days | Discharge: HOME | End: 2022-05-25
Attending: STUDENT IN AN ORGANIZED HEALTH CARE EDUCATION/TRAINING PROGRAM | Admitting: STUDENT IN AN ORGANIZED HEALTH CARE EDUCATION/TRAINING PROGRAM
Payer: MEDICARE

## 2022-05-24 VITALS
WEIGHT: 199.96 LBS | OXYGEN SATURATION: 98 % | HEART RATE: 49 BPM | SYSTOLIC BLOOD PRESSURE: 150 MMHG | DIASTOLIC BLOOD PRESSURE: 81 MMHG

## 2022-05-24 DIAGNOSIS — Z95.1 PRESENCE OF AORTOCORONARY BYPASS GRAFT: Chronic | ICD-10-CM

## 2022-05-24 DIAGNOSIS — Z86.69 PERSONAL HISTORY OF OTHER DISEASES OF THE NERVOUS SYSTEM AND SENSE ORGANS: Chronic | ICD-10-CM

## 2022-05-24 LAB
ANION GAP SERPL CALC-SCNC: 12 MMOL/L — SIGNIFICANT CHANGE UP (ref 7–14)
BUN SERPL-MCNC: 39 MG/DL — HIGH (ref 10–20)
CALCIUM SERPL-MCNC: 9.7 MG/DL — SIGNIFICANT CHANGE UP (ref 8.5–10.1)
CHLORIDE SERPL-SCNC: 102 MMOL/L — SIGNIFICANT CHANGE UP (ref 98–110)
CO2 SERPL-SCNC: 23 MMOL/L — SIGNIFICANT CHANGE UP (ref 17–32)
CREAT SERPL-MCNC: 1.4 MG/DL — SIGNIFICANT CHANGE UP (ref 0.7–1.5)
EGFR: 53 ML/MIN/1.73M2 — LOW
GLUCOSE BLDC GLUCOMTR-MCNC: 104 MG/DL — HIGH (ref 70–99)
GLUCOSE BLDC GLUCOMTR-MCNC: 107 MG/DL — HIGH (ref 70–99)
GLUCOSE BLDC GLUCOMTR-MCNC: 85 MG/DL — SIGNIFICANT CHANGE UP (ref 70–99)
GLUCOSE SERPL-MCNC: 113 MG/DL — HIGH (ref 70–99)
HCT VFR BLD CALC: 34.4 % — LOW (ref 42–52)
HGB BLD-MCNC: 10.9 G/DL — LOW (ref 14–18)
MCHC RBC-ENTMCNC: 26.7 PG — LOW (ref 27–31)
MCHC RBC-ENTMCNC: 31.7 G/DL — LOW (ref 32–37)
MCV RBC AUTO: 84.1 FL — SIGNIFICANT CHANGE UP (ref 80–94)
NRBC # BLD: 0 /100 WBCS — SIGNIFICANT CHANGE UP (ref 0–0)
PLATELET # BLD AUTO: 201 K/UL — SIGNIFICANT CHANGE UP (ref 130–400)
POTASSIUM SERPL-MCNC: 4.3 MMOL/L — SIGNIFICANT CHANGE UP (ref 3.5–5)
POTASSIUM SERPL-SCNC: 4.3 MMOL/L — SIGNIFICANT CHANGE UP (ref 3.5–5)
RBC # BLD: 4.09 M/UL — LOW (ref 4.7–6.1)
RBC # FLD: 16.7 % — HIGH (ref 11.5–14.5)
SODIUM SERPL-SCNC: 137 MMOL/L — SIGNIFICANT CHANGE UP (ref 135–146)
WBC # BLD: 6.83 K/UL — SIGNIFICANT CHANGE UP (ref 4.8–10.8)
WBC # FLD AUTO: 6.83 K/UL — SIGNIFICANT CHANGE UP (ref 4.8–10.8)

## 2022-05-24 PROCEDURE — 37230: CPT | Mod: LT

## 2022-05-24 PROCEDURE — 36246 INS CATH ABD/L-EXT ART 2ND: CPT | Mod: 59

## 2022-05-24 PROCEDURE — 37232: CPT | Mod: LT

## 2022-05-24 PROCEDURE — 75710 ARTERY X-RAYS ARM/LEG: CPT | Mod: 26,XU

## 2022-05-24 RX ORDER — OXYBUTYNIN CHLORIDE 5 MG
10 TABLET ORAL AT BEDTIME
Refills: 0 | Status: DISCONTINUED | OUTPATIENT
Start: 2022-05-24 | End: 2022-05-25

## 2022-05-24 RX ORDER — ATORVASTATIN CALCIUM 80 MG/1
20 TABLET, FILM COATED ORAL AT BEDTIME
Refills: 0 | Status: DISCONTINUED | OUTPATIENT
Start: 2022-05-24 | End: 2022-05-25

## 2022-05-24 RX ORDER — LOSARTAN POTASSIUM 100 MG/1
100 TABLET, FILM COATED ORAL DAILY
Refills: 0 | Status: DISCONTINUED | OUTPATIENT
Start: 2022-05-24 | End: 2022-05-25

## 2022-05-24 RX ORDER — RANOLAZINE 500 MG/1
500 TABLET, FILM COATED, EXTENDED RELEASE ORAL
Refills: 0 | Status: DISCONTINUED | OUTPATIENT
Start: 2022-05-24 | End: 2022-05-25

## 2022-05-24 RX ORDER — RANOLAZINE 500 MG/1
1 TABLET, FILM COATED, EXTENDED RELEASE ORAL
Qty: 0 | Refills: 0 | DISCHARGE

## 2022-05-24 RX ORDER — LISINOPRIL 2.5 MG/1
5 TABLET ORAL DAILY
Refills: 0 | Status: DISCONTINUED | OUTPATIENT
Start: 2022-05-25 | End: 2022-05-25

## 2022-05-24 RX ORDER — ATORVASTATIN CALCIUM 80 MG/1
1 TABLET, FILM COATED ORAL
Qty: 0 | Refills: 0 | DISCHARGE

## 2022-05-24 RX ORDER — ASPIRIN/CALCIUM CARB/MAGNESIUM 324 MG
81 TABLET ORAL DAILY
Refills: 0 | Status: DISCONTINUED | OUTPATIENT
Start: 2022-05-25 | End: 2022-05-25

## 2022-05-24 RX ORDER — OXYCODONE AND ACETAMINOPHEN 5; 325 MG/1; MG/1
1 TABLET ORAL EVERY 12 HOURS
Refills: 0 | Status: DISCONTINUED | OUTPATIENT
Start: 2022-05-24 | End: 2022-05-25

## 2022-05-24 RX ORDER — PANTOPRAZOLE SODIUM 20 MG/1
40 TABLET, DELAYED RELEASE ORAL
Refills: 0 | Status: DISCONTINUED | OUTPATIENT
Start: 2022-05-24 | End: 2022-05-25

## 2022-05-24 RX ORDER — AMLODIPINE BESYLATE 2.5 MG/1
10 TABLET ORAL DAILY
Refills: 0 | Status: DISCONTINUED | OUTPATIENT
Start: 2022-05-24 | End: 2022-05-25

## 2022-05-24 RX ORDER — SODIUM CHLORIDE 9 MG/ML
1000 INJECTION INTRAMUSCULAR; INTRAVENOUS; SUBCUTANEOUS
Refills: 0 | Status: DISCONTINUED | OUTPATIENT
Start: 2022-05-24 | End: 2022-05-25

## 2022-05-24 RX ORDER — TAMSULOSIN HYDROCHLORIDE 0.4 MG/1
0.4 CAPSULE ORAL DAILY
Refills: 0 | Status: DISCONTINUED | OUTPATIENT
Start: 2022-05-25 | End: 2022-05-25

## 2022-05-24 RX ORDER — CLOPIDOGREL BISULFATE 75 MG/1
75 TABLET, FILM COATED ORAL DAILY
Refills: 0 | Status: DISCONTINUED | OUTPATIENT
Start: 2022-05-25 | End: 2022-05-25

## 2022-05-24 RX ORDER — ISOSORBIDE MONONITRATE 60 MG/1
30 TABLET, EXTENDED RELEASE ORAL DAILY
Refills: 0 | Status: DISCONTINUED | OUTPATIENT
Start: 2022-05-25 | End: 2022-05-25

## 2022-05-24 RX ORDER — DULOXETINE HYDROCHLORIDE 30 MG/1
60 CAPSULE, DELAYED RELEASE ORAL DAILY
Refills: 0 | Status: DISCONTINUED | OUTPATIENT
Start: 2022-05-25 | End: 2022-05-25

## 2022-05-24 RX ADMIN — Medication 10 MILLIGRAM(S): at 22:17

## 2022-05-24 RX ADMIN — SODIUM CHLORIDE 100 MILLILITER(S): 9 INJECTION INTRAMUSCULAR; INTRAVENOUS; SUBCUTANEOUS at 09:44

## 2022-05-24 RX ADMIN — ATORVASTATIN CALCIUM 20 MILLIGRAM(S): 80 TABLET, FILM COATED ORAL at 22:17

## 2022-05-24 RX ADMIN — Medication 1 TABLET(S): at 18:50

## 2022-05-24 RX ADMIN — OXYCODONE AND ACETAMINOPHEN 1 TABLET(S): 5; 325 TABLET ORAL at 22:16

## 2022-05-24 RX ADMIN — Medication 200 MILLIGRAM(S): at 22:16

## 2022-05-24 RX ADMIN — OXYCODONE AND ACETAMINOPHEN 1 TABLET(S): 5; 325 TABLET ORAL at 22:46

## 2022-05-24 RX ADMIN — RANOLAZINE 500 MILLIGRAM(S): 500 TABLET, FILM COATED, EXTENDED RELEASE ORAL at 18:50

## 2022-05-24 NOTE — PATIENT PROFILE ADULT - FALL HARM RISK - HARM RISK INTERVENTIONS

## 2022-05-24 NOTE — CHART NOTE - NSCHARTNOTEFT_GEN_A_CORE
INDICATION:   LLE CLI  Charlie class 4    PHYSICIAN: Dr. Guido   ASSISTANT/FELLOW: Dr. Drummond    ACCESS: Ultrasound Guided antegrade left femoral artery access and left DP access     VASCULAR CLOSURE DEVICE (if applicable): manual for antegrade femoral access. D stat for DP access    ANGIOGRAM:  Left  Lower Extremity DSA angiography       DIAGNOSTIC FINDINGS    Left SFA Artery: mild disease   Left Popliteal Artery: mild disease   Left Tibial Peroneal Trunk Artery: patent   Left Anterior Tibial Artery: occluded in proximal segment. intervention was performed.   Left Peroneal Artery: 80% diffuse stenosis in proximal segment. intervention was performed.   Left Posterior Tibial Artery: patent   Left Dorsalis Pedis Artery: patent     COMPLICATION: none    Specimens removed: N/A     PERIPHERAL DIAGNOSTIC ANGIOGRAM/INTERVENTION SUMMARY:    Significant left lower extremity infrapopliteal PAD (Patent PT, occluded AT and severe stenosis of peroneal)    S/p successful recanalization of AT using antegrade SFA and DP accesses followed PTA, repair with Tack endovascular and JANICE x 1 placement.   S/p successful PTA of peroneal artery       RECOMMENDATIONS:  Tele monitoring overnight  monitor access sites  post procedure IV hydration  cont medical therapy and risk factor modification

## 2022-05-24 NOTE — H&P CARDIOLOGY - HISTORY OF PRESENT ILLNESS
70 y/o male PMHx: PAD (Charlie 4), DM, HTN, CAD s/p PCI x 1 2012; PSH: CABG x 2 7/2003 x2 at [facility]. Presents here today for peripheral angiogram to left SFA.   BLE Diagnostic Angio 10/2021 showed R 70% profunda stenosis, 70% deep femoral, Occluded RAT, R peroneal ; L 75% popliteal, Occluded LAT A lesion. No stents placed  Arterial duplex 10/2021 showed No severe arterial stenosis in R extremity or L extremity  LEELA w/PVR 09/2021 showed no significant PAD in BLE. LEELA L 0.92, R 0.95  Coronary angio. 08/12/2021 showed triple vessel disease. Patent graft. d/c on medical management  NM stress 07/2021 showed reversible Inferolateral infarct.      Right PTA s/p multiple stents 12/2021                     PAST MEDICAL HISTORY:  CAD (coronary artery disease) s/p PCI x1 2012, MI x 2 (pt unsure of date)  DM (diabetes mellitus)   Hypertension Diabetes   CABG  Tricia Neuropathy  Hyperlipidemia   Hypertension  Myocardial Infarction    Liver cirrhosis.     PAST SURGICAL HISTORY:  H/O carpal tunnel syndrome   Knee Surgery  S/P CABG x 2 7/2003 @ [facility](LIMA-LAD, SVG to RCA).  PCI to LLE

## 2022-05-24 NOTE — ASU PATIENT PROFILE, ADULT - FALL HARM RISK - HARM RISK INTERVENTIONS

## 2022-05-24 NOTE — H&P CARDIOLOGY - NSICDXPASTSURGICALHX_GEN_ALL_CORE_FT
PAST SURGICAL HISTORY:  H/O carpal tunnel syndrome Knee Surgery    S/P CABG x 2 7/2003 @ [facility](LIMA-LAD, SVG to RCA)

## 2022-05-24 NOTE — PHARMACOTHERAPY INTERVENTION NOTE - COMMENTS
oxybutynin IR 10mg daily, d/w ACP Edith, recommended changing to 5mg po q12h  -pt home regimen: oxybutynin XL 10mg hs, will order as nonformulary

## 2022-05-25 ENCOUNTER — TRANSCRIPTION ENCOUNTER (OUTPATIENT)
Age: 72
End: 2022-05-25

## 2022-05-25 VITALS
TEMPERATURE: 97 F | DIASTOLIC BLOOD PRESSURE: 55 MMHG | SYSTOLIC BLOOD PRESSURE: 102 MMHG | HEART RATE: 59 BPM | WEIGHT: 202.38 LBS | RESPIRATION RATE: 18 BRPM

## 2022-05-25 LAB
ANION GAP SERPL CALC-SCNC: 8 MMOL/L — SIGNIFICANT CHANGE UP (ref 7–14)
BUN SERPL-MCNC: 27 MG/DL — HIGH (ref 10–20)
CALCIUM SERPL-MCNC: 9.3 MG/DL — SIGNIFICANT CHANGE UP (ref 8.5–10.1)
CHLORIDE SERPL-SCNC: 114 MMOL/L — HIGH (ref 98–110)
CO2 SERPL-SCNC: 23 MMOL/L — SIGNIFICANT CHANGE UP (ref 17–32)
CREAT SERPL-MCNC: 1.2 MG/DL — SIGNIFICANT CHANGE UP (ref 0.7–1.5)
EGFR: 63 ML/MIN/1.73M2 — SIGNIFICANT CHANGE UP
GLUCOSE BLDC GLUCOMTR-MCNC: 109 MG/DL — HIGH (ref 70–99)
GLUCOSE BLDC GLUCOMTR-MCNC: 118 MG/DL — HIGH (ref 70–99)
GLUCOSE BLDC GLUCOMTR-MCNC: 87 MG/DL — SIGNIFICANT CHANGE UP (ref 70–99)
GLUCOSE SERPL-MCNC: 93 MG/DL — SIGNIFICANT CHANGE UP (ref 70–99)
HCT VFR BLD CALC: 31.8 % — LOW (ref 42–52)
HGB BLD-MCNC: 9.9 G/DL — LOW (ref 14–18)
MCHC RBC-ENTMCNC: 26.5 PG — LOW (ref 27–31)
MCHC RBC-ENTMCNC: 31.1 G/DL — LOW (ref 32–37)
MCV RBC AUTO: 85 FL — SIGNIFICANT CHANGE UP (ref 80–94)
NRBC # BLD: 0 /100 WBCS — SIGNIFICANT CHANGE UP (ref 0–0)
PLATELET # BLD AUTO: 178 K/UL — SIGNIFICANT CHANGE UP (ref 130–400)
POTASSIUM SERPL-MCNC: 4.9 MMOL/L — SIGNIFICANT CHANGE UP (ref 3.5–5)
POTASSIUM SERPL-SCNC: 4.9 MMOL/L — SIGNIFICANT CHANGE UP (ref 3.5–5)
RBC # BLD: 3.74 M/UL — LOW (ref 4.7–6.1)
RBC # FLD: 16.9 % — HIGH (ref 11.5–14.5)
SODIUM SERPL-SCNC: 145 MMOL/L — SIGNIFICANT CHANGE UP (ref 135–146)
WBC # BLD: 6.45 K/UL — SIGNIFICANT CHANGE UP (ref 4.8–10.8)
WBC # FLD AUTO: 6.45 K/UL — SIGNIFICANT CHANGE UP (ref 4.8–10.8)

## 2022-05-25 PROCEDURE — 99239 HOSP IP/OBS DSCHRG MGMT >30: CPT

## 2022-05-25 RX ORDER — AMLODIPINE BESYLATE AND OLMESARTRAN MEDOXOMIL 10; 40 MG/1; MG/1
1 TABLET, FILM COATED ORAL
Qty: 0 | Refills: 0 | DISCHARGE

## 2022-05-25 RX ORDER — LISINOPRIL 2.5 MG/1
1 TABLET ORAL
Qty: 30 | Refills: 0
Start: 2022-05-25 | End: 2022-06-23

## 2022-05-25 RX ORDER — ISOSORBIDE MONONITRATE 60 MG/1
1 TABLET, EXTENDED RELEASE ORAL
Qty: 0 | Refills: 0 | DISCHARGE

## 2022-05-25 RX ORDER — ISOSORBIDE MONONITRATE 60 MG/1
1 TABLET, EXTENDED RELEASE ORAL
Qty: 30 | Refills: 0
Start: 2022-05-25 | End: 2022-06-23

## 2022-05-25 RX ORDER — LISINOPRIL 2.5 MG/1
1 TABLET ORAL
Qty: 0 | Refills: 0 | DISCHARGE

## 2022-05-25 RX ADMIN — ISOSORBIDE MONONITRATE 30 MILLIGRAM(S): 60 TABLET, EXTENDED RELEASE ORAL at 12:35

## 2022-05-25 RX ADMIN — Medication 1 TABLET(S): at 05:36

## 2022-05-25 RX ADMIN — LISINOPRIL 5 MILLIGRAM(S): 2.5 TABLET ORAL at 05:36

## 2022-05-25 RX ADMIN — CLOPIDOGREL BISULFATE 75 MILLIGRAM(S): 75 TABLET, FILM COATED ORAL at 12:35

## 2022-05-25 RX ADMIN — RANOLAZINE 500 MILLIGRAM(S): 500 TABLET, FILM COATED, EXTENDED RELEASE ORAL at 05:36

## 2022-05-25 RX ADMIN — PANTOPRAZOLE SODIUM 40 MILLIGRAM(S): 20 TABLET, DELAYED RELEASE ORAL at 06:16

## 2022-05-25 RX ADMIN — DULOXETINE HYDROCHLORIDE 60 MILLIGRAM(S): 30 CAPSULE, DELAYED RELEASE ORAL at 12:35

## 2022-05-25 RX ADMIN — Medication 81 MILLIGRAM(S): at 12:35

## 2022-05-25 RX ADMIN — Medication 200 MILLIGRAM(S): at 05:44

## 2022-05-25 RX ADMIN — TAMSULOSIN HYDROCHLORIDE 0.4 MILLIGRAM(S): 0.4 CAPSULE ORAL at 12:36

## 2022-05-25 RX ADMIN — LOSARTAN POTASSIUM 100 MILLIGRAM(S): 100 TABLET, FILM COATED ORAL at 05:36

## 2022-05-25 NOTE — PROGRESS NOTE ADULT - ASSESSMENT
Patient feeling well. Discussed blood pressure regimen. -110s here.     - Hold home lisinopril 10 mg on 5/26, resume on 5/27  - Continue Imdur 30  - Patient takes amlodipine-olmesartan about once per month for SBP>150, encouraged him to discuss this with his outpatient cardiologist  - Stable for discharge home  - F/u with Dr. Lata Guillermo MD  Vascular Cardiology Attending  Please text or call via MS Teams with questions

## 2022-05-25 NOTE — DISCHARGE NOTE PROVIDER - NSDCFUADDINST_GEN_ALL_CORE_FT
You may showed today  No bath or pool x 1 week    No strenuous exercise or heavy lifting for 1 week  No Driving for 2 days  Cont ASA and Plavix with out interruption  Resume all previous outpatient medications  You may showed today  No bath or pool x 1 week    No strenuous exercise or heavy lifting for 1 week  No Driving for 2 days  Cont ASA and Plavix with out interruption    Resume Lisinopril 5mg PO Daily 5/27/22  Stop Amlodipine-Omlesartan

## 2022-05-25 NOTE — PROGRESS NOTE ADULT - SUBJECTIVE AND OBJECTIVE BOX
Vascular Cardiology Follow up s/p PTA    SOHEILA FOX   72y Male  PAST MEDICAL & SURGICAL HISTORY:  Hypertension  Diabetes   CABG  Tricia Neuropathy  Hyperlipidemia   Hypertension  Myocardial Infarction      CAD (coronary artery disease)  s/p PCI x1 2012, MI x 2 (pt unsure of date)      Liver cirrhosis      DM (diabetes mellitus)      H/O carpal tunnel syndrome  Knee Surgery      S/P CABG x 2  7/2003 @ [facility](LIMA-LAD, SVG to RCA)       HPI:  70 y/o male PMHx: PAD (Charlie 4), DM, HTN, CAD s/p PCI x 1 2012; PSH: CABG x 2 7/2003 x2 at [facility]. Presents here today for peripheral angiogram to left SFA.   BLE Diagnostic Angio 10/2021 showed R 70% profunda stenosis, 70% deep femoral, Occluded RAT, R peroneal ; L 75% popliteal, Occluded LAT A lesion. No stents placed  Arterial duplex 10/2021 showed No severe arterial stenosis in R extremity or L extremity  LEELA w/PVR 09/2021 showed no significant PAD in BLE. LEELA L 0.92, R 0.95  Coronary angio. 08/12/2021 showed triple vessel disease. Patent graft. d/c on medical management  NM stress 07/2021 showed reversible Inferolateral infarct.      Right PTA s/p multiple stents 12/2021       PAST MEDICAL HISTORY:  CAD (coronary artery disease) s/p PCI x1 2012, MI x 2 (pt unsure of date)  DM (diabetes mellitus)   Hypertension Diabetes   CABG  Tricia Neuropathy  Hyperlipidemia   Hypertension  Myocardial Infarction    Liver cirrhosis.     PAST SURGICAL HISTORY:  H/O carpal tunnel syndrome   Knee Surgery  S/P CABG x 2 7/2003 @ [facility](LIMA-LAD, SVG to RCA).  PCI to LLE     (24 May 2022 06:26)    Allergies    No Known Allergies    Intolerances      Patient without complaints.   Denies CP, SOB, palpitations, or dizziness  No events on telemetry overnight    Vital Signs Last 24 Hrs  T(C): 36.3 (25 May 2022 04:40), Max: 36.3 (25 May 2022 04:40)  T(F): 97.4 (25 May 2022 04:40), Max: 97.4 (25 May 2022 04:40)  HR: 59 (25 May 2022 04:40) (51 - 59)  BP: 102/55 (25 May 2022 04:40) (102/55 - 119/64)  BP(mean): 83 (24 May 2022 20:49) (83 - 83)  RR: 18 (25 May 2022 04:40) (18 - 18)  SpO2: 96% (24 May 2022 20:53) (96% - 96%)    MEDICATIONS  (STANDING):  amLODIPine   Tablet 10 milliGRAM(s) Oral daily  amoxicillin  875 milliGRAM(s)/clavulanate 1 Tablet(s) Oral every 12 hours  aspirin enteric coated 81 milliGRAM(s) Oral daily  atorvastatin 20 milliGRAM(s) Oral at bedtime  clopidogrel Tablet 75 milliGRAM(s) Oral daily  DULoxetine 60 milliGRAM(s) Oral daily  isosorbide   mononitrate ER Tablet (IMDUR) 30 milliGRAM(s) Oral daily  lisinopril 5 milliGRAM(s) Oral daily  losartan 100 milliGRAM(s) Oral daily  oxybutynin XL 10 milliGRAM(s) Oral at bedtime  pantoprazole    Tablet 40 milliGRAM(s) Oral before breakfast  pregabalin 200 milliGRAM(s) Oral three times a day  ranolazine 500 milliGRAM(s) Oral two times a day  sodium chloride 0.9%. 1000 milliLiter(s) (100 mL/Hr) IV Continuous <Continuous>  tamsulosin 0.4 milliGRAM(s) Oral daily    MEDICATIONS  (PRN):  oxycodone    5 mG/acetaminophen 325 mG 1 Tablet(s) Oral every 12 hours PRN Moderate Pain (4 - 6)      REVIEW OF SYSTEMS:          All negative except as mentioned in HPI    PHYSICAL EXAM:           CONSTITUTIONAL: Well-developed; well-nourished; in no acute distress  	SKIN: warm, dry  	HEAD: Normocephalic; atraumatic  	EYES: PERRL.  	ENT: No nasal discharge, airway clear, mucous membranes moist  	NECK: Supple; non tender.  	CARD: +S1, +S2, no murmurs, gallops, or rubs. Regular rate and rhythm    	RESP: No wheezes, rales or rhonchi. CTA B/L  	ABD: soft ntnd, + BS x 4 quadrants  	EXT: moves all extremities,  no clubbing, cyanosis or edema  	NEURO: Alert and oriented x3, no focal deficits          PSYCH: Cooperative, appropriate          VASCULAR:  + Rad / + PTs / + DPs          EXTREMITY:             Left Groin: dressing removed, access site soft, no hematoma, no pain, + pulses, no sign of infection, no numbness             Left DP accesses: dressing D/C/I, access site soft, no hematoma, no pain, + pulses, no sign of infection, no numbness  	          ECG:   < from: 12 Lead ECG (12.16.21 @ 14:51) >  Ventricular Rate 62 BPM    Atrial Rate 62 BPM    P-R Interval 154 ms    QRS Duration 108 ms    Q-T Interval 418 ms    QTC Calculation(Bazett) 424 ms    P Axis -6 degrees    R Axis -20 degrees    T Axis 55 degrees    Diagnosis Line Normal sinus rhythm with sinus arrhythmia  Normal ECG    Confirmed by BRENNON MARIE MD [id number]) on 12/17/2021 6:11:41 AM                                                                                         LABS:                        9.9    6.45  )-----------( 178      ( 25 May 2022 06:12 )             31.8     05-25    145  |  114<H>  |  27<H>  ----------------------------<  93  4.9   |  23  |  1.2    Ca    9.3      25 May 2022 06:12    A/P:  I discussed the case with Cardiologist Dr. Jay Guido and recommend the following:    S/P PTA:    PERIPHERAL DIAGNOSTIC ANGIOGRAM/INTERVENTION SUMMARY:    Significant left lower extremity infrapopliteal PAD (Patent PT, occluded AT and severe stenosis of peroneal)    S/p successful recanalization of AT using antegrade SFA and DP accesses followed PTA, repair with Tack endovascular and JANICE x 1 placement.   S/p successful PTA of peroneal artery     	     Continue DAPT ( Aspirin 81 mg PO Daily and Plavix 75 mg daily  ), Ranexa, Isosorbide, ACEi, CCB, B-Blocker, Statin Therapy                   Patient given 30 day supply of ( Aspirin 81 mg daily and Plavix 75 mg daily ) to take at home                   Monitor access site                   No strenuous activity for 3 days (routine walking okay)                   No heavy lifting > 30lbs for 2 weeks                   May shower in 24 hours                   Leave dressing in place 24- 48 hours, if does not fall off in 48 hours can remove                   Patient agreeing to take DAPT as directed by cardiologist                    Post angiogram instructions, access site care and activity restrictions reviewed with patient                     Discussed with patient to return to hospital if experience severe lower extremity pain and site bleeding                   Aggressive risk factor modification, diet counseling, smoking cessation discussed with patient                      Can discharge patient from cardiovascular standpoint after ambulating without symptoms, access site wnl, labs and ECG reviewed                    Follow up with Cardiology Dr. Jay Guido in two weeks.  Instructed to call and make an appointment                                       Vascular Cardiology Follow up s/p PTA    SOHEILA FOX   72y Male  PAST MEDICAL & SURGICAL HISTORY:  Hypertension  Diabetes   CABG  Tricia Neuropathy  Hyperlipidemia   Hypertension  Myocardial Infarction      CAD (coronary artery disease)  s/p PCI x1 2012, MI x 2 (pt unsure of date)      Liver cirrhosis      DM (diabetes mellitus)      H/O carpal tunnel syndrome  Knee Surgery      S/P CABG x 2  7/2003 @ [facility](LIMA-LAD, SVG to RCA)       HPI:  70 y/o male PMHx: PAD (Charlie 4), DM, HTN, CAD s/p PCI x 1 2012; PSH: CABG x 2 7/2003 x2 at [facility]. Presents here today for peripheral angiogram to left SFA.   BLE Diagnostic Angio 10/2021 showed R 70% profunda stenosis, 70% deep femoral, Occluded RAT, R peroneal ; L 75% popliteal, Occluded LAT A lesion. No stents placed  Arterial duplex 10/2021 showed No severe arterial stenosis in R extremity or L extremity  LEELA w/PVR 09/2021 showed no significant PAD in BLE. LEELA L 0.92, R 0.95  Coronary angio. 08/12/2021 showed triple vessel disease. Patent graft. d/c on medical management  NM stress 07/2021 showed reversible Inferolateral infarct.      Right PTA s/p multiple stents 12/2021       PAST MEDICAL HISTORY:  CAD (coronary artery disease) s/p PCI x1 2012, MI x 2 (pt unsure of date)  DM (diabetes mellitus)   Hypertension Diabetes   CABG  Tricia Neuropathy  Hyperlipidemia   Hypertension  Myocardial Infarction    Liver cirrhosis.     PAST SURGICAL HISTORY:  H/O carpal tunnel syndrome   Knee Surgery  S/P CABG x 2 7/2003 @ [facility](LIMA-LAD, SVG to RCA).  PCI to LLE     (24 May 2022 06:26)    Allergies    No Known Allergies    Intolerances      Patient without complaints.   Denies CP, SOB, palpitations, or dizziness  Bradycardiac 40's noted on telemetry overnight    Vital Signs Last 24 Hrs  T(C): 36.3 (25 May 2022 04:40), Max: 36.3 (25 May 2022 04:40)  T(F): 97.4 (25 May 2022 04:40), Max: 97.4 (25 May 2022 04:40)  HR: 59 (25 May 2022 04:40) (51 - 59)  BP: 102/55 (25 May 2022 04:40) (102/55 - 119/64)  BP(mean): 83 (24 May 2022 20:49) (83 - 83)  RR: 18 (25 May 2022 04:40) (18 - 18)  SpO2: 96% (24 May 2022 20:53) (96% - 96%)    MEDICATIONS  (STANDING):  amLODIPine   Tablet 10 milliGRAM(s) Oral daily  amoxicillin  875 milliGRAM(s)/clavulanate 1 Tablet(s) Oral every 12 hours  aspirin enteric coated 81 milliGRAM(s) Oral daily  atorvastatin 20 milliGRAM(s) Oral at bedtime  clopidogrel Tablet 75 milliGRAM(s) Oral daily  DULoxetine 60 milliGRAM(s) Oral daily  isosorbide   mononitrate ER Tablet (IMDUR) 30 milliGRAM(s) Oral daily  lisinopril 5 milliGRAM(s) Oral daily  losartan 100 milliGRAM(s) Oral daily  oxybutynin XL 10 milliGRAM(s) Oral at bedtime  pantoprazole    Tablet 40 milliGRAM(s) Oral before breakfast  pregabalin 200 milliGRAM(s) Oral three times a day  ranolazine 500 milliGRAM(s) Oral two times a day  sodium chloride 0.9%. 1000 milliLiter(s) (100 mL/Hr) IV Continuous <Continuous>  tamsulosin 0.4 milliGRAM(s) Oral daily    MEDICATIONS  (PRN):  oxycodone    5 mG/acetaminophen 325 mG 1 Tablet(s) Oral every 12 hours PRN Moderate Pain (4 - 6)      REVIEW OF SYSTEMS:          All negative except as mentioned in HPI    PHYSICAL EXAM:           CONSTITUTIONAL: Well-developed; well-nourished; in no acute distress  	SKIN: warm, dry  	HEAD: Normocephalic; atraumatic  	EYES: PERRL.  	ENT: No nasal discharge, airway clear, mucous membranes moist  	NECK: Supple; non tender.  	CARD: +S1, +S2, no murmurs, gallops, or rubs. Regular rate and rhythm    	RESP: No wheezes, rales or rhonchi. CTA B/L  	ABD: soft ntnd, + BS x 4 quadrants  	EXT: moves all extremities,  no clubbing, cyanosis or edema  	NEURO: Alert and oriented x3, no focal deficits          PSYCH: Cooperative, appropriate          VASCULAR:  + Rad / + PTs / + DPs          EXTREMITY:             Left Groin: dressing removed, access site soft, no hematoma, no pain, + pulses, no sign of infection, no numbness             Left DP accesses: dressing D/C/I, access site soft, no hematoma, no pain, + pulses, no sign of infection, no numbness  	          ECG:   < from: 12 Lead ECG (12.16.21 @ 14:51) >  Ventricular Rate 62 BPM    Atrial Rate 62 BPM    P-R Interval 154 ms    QRS Duration 108 ms    Q-T Interval 418 ms    QTC Calculation(Bazett) 424 ms    P Axis -6 degrees    R Axis -20 degrees    T Axis 55 degrees    Diagnosis Line Normal sinus rhythm with sinus arrhythmia  Normal ECG    Confirmed by BRENNON MARIE MD ([id number]) on 12/17/2021 6:11:41 AM                                                                                         LABS:                        9.9    6.45  )-----------( 178      ( 25 May 2022 06:12 )             31.8     05-25    145  |  114<H>  |  27<H>  ----------------------------<  93  4.9   |  23  |  1.2    Ca    9.3      25 May 2022 06:12    A/P:  I discussed the case with Cardiologist Dr. Jay Guido and recommend the following:    S/P PTA:    PERIPHERAL DIAGNOSTIC ANGIOGRAM/INTERVENTION SUMMARY:    Significant left lower extremity infrapopliteal PAD (Patent PT, occluded AT and severe stenosis of peroneal)    S/p successful recanalization of AT using antegrade SFA and DP accesses followed PTA, repair with Tack endovascular and JANICE x 1 placement.   S/p successful PTA of peroneal artery                      No BB due to bradycardia                    Reevaluate B/P management prior D/C home   	     Continue DAPT ( Aspirin 81 mg PO Daily and Plavix 75 mg daily  ), Ranexa, Isosorbide, Acei, Statin Therapy                   Patient given 30 day supply of ( Aspirin 81 mg daily and Plavix 75 mg daily ) to take at home                   Monitor access site                   No strenuous activity for 3 days (routine walking okay)                   No heavy lifting > 30lbs for 2 weeks                   May shower in 24 hours                   Leave dressing in place 24- 48 hours, if does not fall off in 48 hours can remove                   Patient agreeing to take DAPT as directed by cardiologist                    Post angiogram instructions, access site care and activity restrictions reviewed with patient                     Discussed with patient to return to hospital if experience severe lower extremity pain and site bleeding                   Aggressive risk factor modification, diet counseling, smoking cessation discussed with patient                      Can discharge patient from cardiovascular standpoint after ambulating without symptoms, access site wnl, labs and ECG reviewed                    Follow up with Cardiology Dr. Jay Guido in two weeks.  Instructed to call and make an appointment                                       Vascular Cardiology Follow up s/p PTA    SOHEILA FOX   72y Male  PAST MEDICAL & SURGICAL HISTORY:  Hypertension  Diabetes   CABG  Tricia Neuropathy  Hyperlipidemia   Hypertension  Myocardial Infarction      CAD (coronary artery disease)  s/p PCI x1 2012, MI x 2 (pt unsure of date)      Liver cirrhosis      DM (diabetes mellitus)      H/O carpal tunnel syndrome  Knee Surgery      S/P CABG x 2  7/2003 @ [facility](LIMA-LAD, SVG to RCA)       HPI:  70 y/o male PMHx: PAD (Charlie 4), DM, HTN, CAD s/p PCI x 1 2012; PSH: CABG x 2 7/2003 x2 at [facility]. Presents here today for peripheral angiogram to left SFA.   BLE Diagnostic Angio 10/2021 showed R 70% profunda stenosis, 70% deep femoral, Occluded RAT, R peroneal ; L 75% popliteal, Occluded LAT A lesion. No stents placed  Arterial duplex 10/2021 showed No severe arterial stenosis in R extremity or L extremity  LEELA w/PVR 09/2021 showed no significant PAD in BLE. LEELA L 0.92, R 0.95  Coronary angio. 08/12/2021 showed triple vessel disease. Patent graft. d/c on medical management  NM stress 07/2021 showed reversible Inferolateral infarct.      Right PTA s/p multiple stents 12/2021       PAST MEDICAL HISTORY:  CAD (coronary artery disease) s/p PCI x1 2012, MI x 2 (pt unsure of date)  DM (diabetes mellitus)   Hypertension Diabetes   CABG  Tricia Neuropathy  Hyperlipidemia   Hypertension  Myocardial Infarction    Liver cirrhosis.     PAST SURGICAL HISTORY:  H/O carpal tunnel syndrome   Knee Surgery  S/P CABG x 2 7/2003 @ [facility](LIMA-LAD, SVG to RCA).  PCI to LLE     (24 May 2022 06:26)    Allergies    No Known Allergies    Intolerances      Patient without complaints.   Denies CP, SOB, palpitations, or dizziness  Bradycardiac 40's noted on telemetry overnight    Vital Signs Last 24 Hrs  T(C): 36.3 (25 May 2022 04:40), Max: 36.3 (25 May 2022 04:40)  T(F): 97.4 (25 May 2022 04:40), Max: 97.4 (25 May 2022 04:40)  HR: 59 (25 May 2022 04:40) (51 - 59)  BP: 102/55 (25 May 2022 04:40) (102/55 - 119/64)  BP(mean): 83 (24 May 2022 20:49) (83 - 83)  RR: 18 (25 May 2022 04:40) (18 - 18)  SpO2: 96% (24 May 2022 20:53) (96% - 96%)    MEDICATIONS  (STANDING):  amLODIPine   Tablet 10 milliGRAM(s) Oral daily  amoxicillin  875 milliGRAM(s)/clavulanate 1 Tablet(s) Oral every 12 hours  aspirin enteric coated 81 milliGRAM(s) Oral daily  atorvastatin 20 milliGRAM(s) Oral at bedtime  clopidogrel Tablet 75 milliGRAM(s) Oral daily  DULoxetine 60 milliGRAM(s) Oral daily  isosorbide   mononitrate ER Tablet (IMDUR) 30 milliGRAM(s) Oral daily  lisinopril 5 milliGRAM(s) Oral daily  losartan 100 milliGRAM(s) Oral daily  oxybutynin XL 10 milliGRAM(s) Oral at bedtime  pantoprazole    Tablet 40 milliGRAM(s) Oral before breakfast  pregabalin 200 milliGRAM(s) Oral three times a day  ranolazine 500 milliGRAM(s) Oral two times a day  sodium chloride 0.9%. 1000 milliLiter(s) (100 mL/Hr) IV Continuous <Continuous>  tamsulosin 0.4 milliGRAM(s) Oral daily    MEDICATIONS  (PRN):  oxycodone    5 mG/acetaminophen 325 mG 1 Tablet(s) Oral every 12 hours PRN Moderate Pain (4 - 6)      REVIEW OF SYSTEMS:          All negative except as mentioned in HPI    PHYSICAL EXAM:           CONSTITUTIONAL: Well-developed; well-nourished; in no acute distress  	SKIN: warm, dry  	HEAD: Normocephalic; atraumatic  	EYES: PERRL.  	ENT: No nasal discharge, airway clear, mucous membranes moist  	NECK: Supple; non tender.  	CARD: +S1, +S2, no murmurs, gallops, or rubs. Regular rate and rhythm    	RESP: No wheezes, rales or rhonchi. CTA B/L  	ABD: soft ntnd, + BS x 4 quadrants  	EXT: moves all extremities,  no clubbing, cyanosis or edema  	NEURO: Alert and oriented x3, no focal deficits          PSYCH: Cooperative, appropriate          VASCULAR:  + Rad / + PTs / + DPs          EXTREMITY:             Left Groin: dressing removed, access site soft, no hematoma, no pain, + pulses, no sign of infection, no numbness             Left DP accesses: dressing D/C/I, access site soft, no hematoma, no pain, + pulses, no sign of infection, no numbness  	          ECG:   < from: 12 Lead ECG (12.16.21 @ 14:51) >  Ventricular Rate 62 BPM    Atrial Rate 62 BPM    P-R Interval 154 ms    QRS Duration 108 ms    Q-T Interval 418 ms    QTC Calculation(Bazett) 424 ms    P Axis -6 degrees    R Axis -20 degrees    T Axis 55 degrees    Diagnosis Line Normal sinus rhythm with sinus arrhythmia  Normal ECG    Confirmed by BRENNON MARIE MD ([id number]) on 12/17/2021 6:11:41 AM                                                                                         LABS:                        9.9    6.45  )-----------( 178      ( 25 May 2022 06:12 )             31.8     05-25    145  |  114<H>  |  27<H>  ----------------------------<  93  4.9   |  23  |  1.2    Ca    9.3      25 May 2022 06:12    A/P:  I discussed the case with Cardiologist Dr. Jay Guido and recommend the following:    S/P PTA:    PERIPHERAL DIAGNOSTIC ANGIOGRAM/INTERVENTION SUMMARY:    Significant left lower extremity infrapopliteal PAD (Patent PT, occluded AT and severe stenosis of peroneal)    S/p successful recanalization of AT using antegrade SFA and DP accesses followed PTA, repair with Tack endovascular and JANICE x 1 placement.   S/p successful PTA of peroneal artery                      No BB due to bradycardia                   Continue DAPT ( Aspirin 81 mg PO Daily and Plavix 75 mg daily  ), Ranexa, Isosorbide, Statin Therapy                   Hold lisinopril on 5/26, resume 5/27                   Patient given 30 day supply of ( Aspirin 81 mg daily and Plavix 75 mg daily ) to take at home                   Monitor access site                   No strenuous activity for 3 days (routine walking okay)                   No heavy lifting > 30lbs for 2 weeks                   May shower in 24 hours                   Leave dressing in place 24- 48 hours, if does not fall off in 48 hours can remove                   Patient agreeing to take DAPT as directed by cardiologist                    Post angiogram instructions, access site care and activity restrictions reviewed with patient                     Discussed with patient to return to hospital if experience severe lower extremity pain and site bleeding                   Aggressive risk factor modification, diet counseling, smoking cessation discussed with patient                      Can discharge patient from cardiovascular standpoint after ambulating without symptoms, access site wnl, labs and ECG reviewed                    Follow up with Cardiology Dr. Jay Guido in two weeks.  Instructed to call and make an appointment

## 2022-05-25 NOTE — DISCHARGE NOTE PROVIDER - NSDCCPTREATMENT_GEN_ALL_CORE_FT
PRINCIPAL PROCEDURE  Procedure: Angiography of peripheral vessel with percutaneous transluminal angioplasty (PTA) if indicated  Findings and Treatment:

## 2022-05-25 NOTE — DISCHARGE NOTE NURSING/CASE MANAGEMENT/SOCIAL WORK - NSDCPEFALRISK_GEN_ALL_CORE
Pt received dinner tray   Pt is being cooperative and pleasant      Sapan Rawls  09/20/21 [id number] For information on Fall & Injury Prevention, visit: [email or web address] OR  [email or web address] OR  https://www.cdc.gov/steadi/patient.html

## 2022-05-25 NOTE — DISCHARGE NOTE NURSING/CASE MANAGEMENT/SOCIAL WORK - PATIENT PORTAL LINK FT
You can access the FollowMyHealth Patient Portal offered by [facility] by registering at the following website: [email or web address]. By joining [facility]’s FollowMyHealth portal, you will also be able to view your health information using other applications (apps) compatible with our system.

## 2022-05-25 NOTE — DISCHARGE NOTE PROVIDER - ATTENDING DISCHARGE PHYSICAL EXAMINATION:
Patient seen and examined. Pertinent labs, imaging and telemetry reviewed. I agree with the above:     Patient feeling well. No issues overnight.   -Discussed about changes with BP medications. Will continue with lisinopril daily. He rarely takes amlodipine-olmesartan, and will discuss with Dr. Rosario about changes.     RRR. S1S2 present.   CTA B/L.   LLE with 1+ DPPT pulses. No hematoma.     Patient is stable for discharge home with outpatient follow up.

## 2022-05-25 NOTE — DISCHARGE NOTE PROVIDER - HOSPITAL COURSE
Patient is a 72 year old male with PMHx of DM, HTN, CAD S/P CABG, PAD with history of non healing ulcers S/P Right BALTA in December 2021 with improved RLE wound healing but with now chronic left LE non healing ulcers. Patient with known Left LE PAD and was referred for peripheral angiogram. On 5/24/22 patient was taken to the cath lab by Dr Guido and underwent left peripheral runoff via the Left femoral artery and left DP. Patient was noted to have significant proximally occluded left AT and a 80% proximal left peroneal artery. Patient underwent JANICE to AT and PTA of the peroneal artery. Patient was monitored overnight. On POD 1 patient is HD stable with no complaints. Examination of Left CFA and Left DP are C/D/I with no hematoma or erythema. PT and DP pulses are 2+ B/L. Patient will cont DAPT with ASA and Plavix. He will be discharged home in stable condition and follow up with Dr Guido as an outpatient. Of note patient was recently hospitalized at [facility] with LE cellulitis and started on Augmentin for 10 days duration and he will continue Augmentin on discharge until 10 day course has been completed

## 2022-05-25 NOTE — DISCHARGE NOTE PROVIDER - CARE PROVIDER_API CALL
Shreyas Guido (MD)  Cardiovascular Disease; Endovascular Medicine; Interventional Cardiology; Vascular Medicine  [address] [place] [address], [place] [address]  Phone: [phone]  Fax: [phone]  Established Patient  Follow Up Time: 2 weeks

## 2022-05-25 NOTE — DISCHARGE NOTE PROVIDER - NSDCMRMEDTOKEN_GEN_ALL_CORE_FT
amlodipine-olmesartan 10 mg-40 mg oral tablet: 1 tab(s) orally once a day  aspirin 81 mg oral tablet: 1 tab(s) orally every other day  Augmentin 875 mg-125 mg oral tablet: 1 tab(s) orally every 12 hours  clopidogrel 75 mg oral tablet: 1 tab(s) orally once a day  DULoxetine 60 mg oral delayed release capsule: 1 cap(s) orally once a day  isosorbide mononitrate 20 mg oral tablet: 1 tab(s) orally 2 times a day  lisinopril 5 mg oral tablet: 1 tab(s) orally once a day  Lyrica 200 mg oral capsule: 1 cap(s) orally 3 times a day  Movantik 25 mg oral tablet: 1 tab(s) orally once a day (in the morning)  omeprazole 20 mg oral delayed release capsule: 1 cap(s) orally once a day  oxybutynin 10 mg/24 hr oral tablet, extended release: 1 tab(s) orally once a day  oxyCODONE 5 mg oral tablet: 1 milligram(s) orally , As Needed  Ranexa 500 mg oral tablet, extended release: 1 tab(s) orally 2 times a day  rosuvastatin 10 mg oral tablet: 1 tab(s) orally once a day  tamsulosin 0.4 mg oral capsule: 1 cap(s) orally once a day  Xtampza ER 18 mg oral capsule, extended release: 1 cap(s) orally every 12 hours   aspirin 81 mg oral tablet: 1 tab(s) orally every other day  Augmentin 875 mg-125 mg oral tablet: 1 tab(s) orally every 12 hours  clopidogrel 75 mg oral tablet: 1 tab(s) orally once a day  DULoxetine 60 mg oral delayed release capsule: 1 cap(s) orally once a day  isosorbide mononitrate 20 mg oral tablet: 1 tab(s) orally 2 times a day  lisinopril 5 mg oral tablet: 1 tab(s) orally once a day  Lyrica 200 mg oral capsule: 1 cap(s) orally 3 times a day  Movantik 25 mg oral tablet: 1 tab(s) orally once a day (in the morning)  omeprazole 20 mg oral delayed release capsule: 1 cap(s) orally once a day  oxybutynin 10 mg/24 hr oral tablet, extended release: 1 tab(s) orally once a day  oxyCODONE 5 mg oral tablet: 1 milligram(s) orally , As Needed  Ranexa 500 mg oral tablet, extended release: 1 tab(s) orally 2 times a day  rosuvastatin 10 mg oral tablet: 1 tab(s) orally once a day  tamsulosin 0.4 mg oral capsule: 1 cap(s) orally once a day  Xtampza ER 18 mg oral capsule, extended release: 1 cap(s) orally every 12 hours   aspirin 81 mg oral tablet: 1 tab(s) orally every other day  Augmentin 875 mg-125 mg oral tablet: 1 tab(s) orally every 12 hours  clopidogrel 75 mg oral tablet: 1 tab(s) orally once a day  DULoxetine 60 mg oral delayed release capsule: 1 cap(s) orally once a day  isosorbide mononitrate 30 mg oral tablet, extended release: 1 tab(s) orally once a day   lisinopril 10 mg oral tablet: 1 tab(s) orally once a day   Lyrica 200 mg oral capsule: 1 cap(s) orally 3 times a day  Movantik 25 mg oral tablet: 1 tab(s) orally once a day (in the morning)  omeprazole 20 mg oral delayed release capsule: 1 cap(s) orally once a day  oxybutynin 10 mg/24 hr oral tablet, extended release: 1 tab(s) orally once a day  oxyCODONE 5 mg oral tablet: 1 milligram(s) orally , As Needed  Ranexa 500 mg oral tablet, extended release: 1 tab(s) orally 2 times a day  rosuvastatin 10 mg oral tablet: 1 tab(s) orally once a day  tamsulosin 0.4 mg oral capsule: 1 cap(s) orally once a day  Xtampza ER 18 mg oral capsule, extended release: 1 cap(s) orally every 12 hours

## 2022-06-03 ENCOUNTER — APPOINTMENT (OUTPATIENT)
Dept: CARDIOLOGY | Facility: CLINIC | Age: 72
End: 2022-06-03
Payer: MEDICARE

## 2022-06-03 VITALS
WEIGHT: 201 LBS | OXYGEN SATURATION: 96 % | DIASTOLIC BLOOD PRESSURE: 80 MMHG | TEMPERATURE: 98.3 F | HEART RATE: 61 BPM | SYSTOLIC BLOOD PRESSURE: 116 MMHG | HEIGHT: 72 IN | BODY MASS INDEX: 27.22 KG/M2

## 2022-06-03 DIAGNOSIS — Z79.02 LONG TERM (CURRENT) USE OF ANTITHROMBOTICS/ANTIPLATELETS: ICD-10-CM

## 2022-06-03 DIAGNOSIS — E11.40 TYPE 2 DIABETES MELLITUS WITH DIABETIC NEUROPATHY, UNSPECIFIED: ICD-10-CM

## 2022-06-03 DIAGNOSIS — L03.116 CELLULITIS OF LEFT LOWER LIMB: ICD-10-CM

## 2022-06-03 DIAGNOSIS — L97.929 NON-PRESSURE CHRONIC ULCER OF UNSPECIFIED PART OF LEFT LOWER LEG WITH UNSPECIFIED SEVERITY: ICD-10-CM

## 2022-06-03 DIAGNOSIS — I73.9 PERIPHERAL VASCULAR DISEASE, UNSPECIFIED: ICD-10-CM

## 2022-06-03 DIAGNOSIS — E11.51 TYPE 2 DIABETES MELLITUS WITH DIABETIC PERIPHERAL ANGIOPATHY WITHOUT GANGRENE: ICD-10-CM

## 2022-06-03 DIAGNOSIS — K74.60 UNSPECIFIED CIRRHOSIS OF LIVER: ICD-10-CM

## 2022-06-03 DIAGNOSIS — I10 ESSENTIAL (PRIMARY) HYPERTENSION: ICD-10-CM

## 2022-06-03 DIAGNOSIS — E11.622 TYPE 2 DIABETES MELLITUS WITH OTHER SKIN ULCER: ICD-10-CM

## 2022-06-03 DIAGNOSIS — Z95.5 PRESENCE OF CORONARY ANGIOPLASTY IMPLANT AND GRAFT: ICD-10-CM

## 2022-06-03 DIAGNOSIS — I25.2 OLD MYOCARDIAL INFARCTION: ICD-10-CM

## 2022-06-03 DIAGNOSIS — Z95.1 PRESENCE OF AORTOCORONARY BYPASS GRAFT: ICD-10-CM

## 2022-06-03 DIAGNOSIS — I73.89 OTHER SPECIFIED PERIPHERAL VASCULAR DISEASES: ICD-10-CM

## 2022-06-03 DIAGNOSIS — Z79.82 LONG TERM (CURRENT) USE OF ASPIRIN: ICD-10-CM

## 2022-06-03 DIAGNOSIS — I70.292 OTHER ATHEROSCLEROSIS OF NATIVE ARTERIES OF EXTREMITIES, LEFT LEG: ICD-10-CM

## 2022-06-03 DIAGNOSIS — I25.10 ATHEROSCLEROTIC HEART DISEASE OF NATIVE CORONARY ARTERY WITHOUT ANGINA PECTORIS: ICD-10-CM

## 2022-06-03 DIAGNOSIS — F17.210 NICOTINE DEPENDENCE, CIGARETTES, UNCOMPLICATED: ICD-10-CM

## 2022-06-03 DIAGNOSIS — I70.245 ATHEROSCLEROSIS OF NATIVE ARTERIES OF LEFT LEG WITH ULCERATION OF OTHER PART OF FOOT: ICD-10-CM

## 2022-06-03 DIAGNOSIS — R00.1 BRADYCARDIA, UNSPECIFIED: ICD-10-CM

## 2022-06-03 PROCEDURE — 93926 LOWER EXTREMITY STUDY: CPT

## 2022-06-03 PROCEDURE — 99214 OFFICE O/P EST MOD 30 MIN: CPT

## 2022-06-03 PROCEDURE — 93923 UPR/LXTR ART STDY 3+ LVLS: CPT

## 2022-06-03 NOTE — ASSESSMENT
[FreeTextEntry1] : 72M with PMH of DM, HTN and CAD s/p CABG, PAD presents for follow up after Right BALTA intervention (PTA/multiple stents)\par \par PAD ivrin classification 4 s/p angioplasty\par b/l venous insufficiency CEAP 2\par \par A successful balloon angioplasty and multiple Synergy stents  on 100% occluded right BALTA 12/16/2021\par \par BLE Diagnostic Angio 10/2021 showed R 70% profunda stenosis, 70% deep femoral, Occluded RAT, R peroneal ; L 75% popliteal, Occluded LAT A lesion. No stents placed\par Arterial duplex 10/2021 showed No severe arterial stenosis in R extremity or L extremity\par LEELA w/PVR 09/2021 showed no significant PAD in BLE. LEELA L 0.92, R 0.95\par Coronary angio 0/2021 showed triple vessel disease. Patent graft. d/c on medical management\par NM stress 07/2021 showed reversible Inferolateral infarct\par \par \par 12/21 Cath  right AT intervention \par \par 5/22  successful PTA of left peroneal and AT\par \par Right hallux  toe ulcer + improvement in size in depth  healing well.   Left toe ulcer stable chronic not healing \par \par \par Plan:\par -Left Le arterial duplex \par - LEELA/PVR \par - 4 weeks f/u \par - Labs today \par - Continue  Plavix/ASA/Statins\par - Wear wool socks in cold weather for acrocyanosis (on norvasc 10 mg already)\par - Avoid pressure to plantar surface of big toes\par - Follow-up with podiatrist, change dressing daily as prescribed\par \par

## 2022-06-03 NOTE — REVIEW OF SYSTEMS
[Chest Discomfort] : chest discomfort [Wheezing] : wheezing [Joint Pain] : joint pain [Negative] : Heme/Lymph [FreeTextEntry6] : takes albuterol [FreeTextEntry9] : knee pain [de-identified] : BLE toes non ischemic bluish discoloration reversed with warmth  B hallux ulcers

## 2022-06-03 NOTE — LETTER BODY
[Dear  ___] : Dear  [unfilled], [FreeTextEntry1] : Itz Mas is being followed by Dr Guido.  Clearance is given to hold plavix and aspirin  for 7 days  for surgical procedure

## 2022-06-03 NOTE — HISTORY OF PRESENT ILLNESS
[FreeTextEntry1] : 72 year old male  with PMH of DM, HTN and CAD s/p CABG, PAD presents for follow up peripheral intervention on R BALTA on 12/16/2021\par Pt reports he had bilateral nonhealing hallux ulcers for 6-7 years. Was advised by podiatrist to limit exercise and therefore does not walk much.  Pt is not active and homebound at the moment. Endorses having shortness of breath and OA requiring steroid injections on R knee all of which limit his ambulation.  Patient is s/p successful intervention on right BALTA (PTA and multiple stents), reports no pain in LE with activity or at rest, denies swelling. Patient follows with podiatrist who at the last appointment noted that right hallux ulcer decreased in size requiaring less frequent appointments,   and left hallux ulcer is unchanged and not healing. Pt presents with advanced diabetic neuropathy.  \par BLE Diagnostic Angio 10/2021 showed R 70% profunda stenosis, 70% deep femoral, Occluded RAT, R peroneal ; L 75% popliteal, Occluded LAT A lesion. No stents placed\par Arterial duplex 10/2021 showed No severe arterial stenosis in R extremity or L extremity\par LEELA w/PVR 09/2021 showed no significant PAD in BLE. LEELA L 0.92, R 0.95\par Coronary angio. 08/12/2021 showed triple vessel disease. Patent graft. d/c on medical management\par NM stress 07/2021 showed reversible Inferolateral infarct.\par Pt stopped smoking cigarettes and uses electronic cigarettes at present time  \par Prior right AT intervention on Dec 16th '21 \par \par Pt presents today post successful PTA of left peroneal and AT on May 24 '22 \par \par

## 2022-06-03 NOTE — PHYSICAL EXAM
[General Appearance - Well Developed] : well developed [Normal Conjunctiva] : the conjunctiva exhibited no abnormalities [] : no respiratory distress [Heart Rate And Rhythm] : heart rate and rhythm were normal [Abdomen Soft] : soft [Abnormal Walk] : normal gait [Skin Color & Pigmentation] : normal skin color and pigmentation [Oriented To Time, Place, And Person] : oriented to person, place, and time [Normal] : soft, non-tender, no masses/organomegaly, normal bowel sounds [No Edema] : no edema [FreeTextEntry1] : Diminished pulses bilateral lower extremities with varicose veins [de-identified] : right DP/PT present, left DP/PT present, diminished popliteal and femoral pulses, acrocyanosis of both feet. [de-identified] : R hallux ulcer + improvement in size, dry  L hallux ulcer chronic not improving  [de-identified] : Right hand  weaknness, right thenar muscles atrophy

## 2022-06-08 ENCOUNTER — NON-APPOINTMENT (OUTPATIENT)
Age: 72
End: 2022-06-08

## 2022-06-08 LAB
ALBUMIN SERPL ELPH-MCNC: 4.2 G/DL
ALP BLD-CCNC: 68 U/L
ALT SERPL-CCNC: 16 U/L
ANION GAP SERPL CALC-SCNC: 10 MMOL/L
AST SERPL-CCNC: 20 U/L
BASOPHILS # BLD AUTO: 0.03 K/UL
BASOPHILS NFR BLD AUTO: 0.4 %
BILIRUB SERPL-MCNC: 0.2 MG/DL
BUN SERPL-MCNC: 26 MG/DL
CALCIUM SERPL-MCNC: 9.4 MG/DL
CHLORIDE SERPL-SCNC: 108 MMOL/L
CHOLEST SERPL-MCNC: 117 MG/DL
CO2 SERPL-SCNC: 26 MMOL/L
CREAT SERPL-MCNC: 1.4 MG/DL
EGFR: 53 ML/MIN/1.73M2
EOSINOPHIL # BLD AUTO: 0.13 K/UL
EOSINOPHIL NFR BLD AUTO: 1.9 %
GLUCOSE SERPL-MCNC: 94 MG/DL
HCT VFR BLD CALC: 33.3 %
HDLC SERPL-MCNC: 43 MG/DL
HGB BLD-MCNC: 10.3 G/DL
IMM GRANULOCYTES NFR BLD AUTO: 0.4 %
LDLC SERPL CALC-MCNC: 53 MG/DL
LYMPHOCYTES # BLD AUTO: 1.3 K/UL
LYMPHOCYTES NFR BLD AUTO: 19.1 %
MAN DIFF?: NORMAL
MCHC RBC-ENTMCNC: 26.9 PG
MCHC RBC-ENTMCNC: 30.9 G/DL
MCV RBC AUTO: 86.9 FL
MONOCYTES # BLD AUTO: 0.49 K/UL
MONOCYTES NFR BLD AUTO: 7.2 %
NEUTROPHILS # BLD AUTO: 4.81 K/UL
NEUTROPHILS NFR BLD AUTO: 71 %
NONHDLC SERPL-MCNC: 74 MG/DL
PLATELET # BLD AUTO: 176 K/UL
POTASSIUM SERPL-SCNC: 5 MMOL/L
PROT SERPL-MCNC: 7.2 G/DL
RBC # BLD: 3.83 M/UL
RBC # FLD: 17.4 %
SODIUM SERPL-SCNC: 144 MMOL/L
TRIGL SERPL-MCNC: 103 MG/DL
WBC # FLD AUTO: 6.79 K/UL

## 2022-06-17 NOTE — REVIEW OF SYSTEMS
[facility]    CARDIAC CATHETERIZATION    Date:   6/17/2022  Patient name:  Jazmin Manning  Date of admission:  6/17/2022 10:32 AM  MRN:   [id number]  YOB: 1973    Operators:  Primary:   Nicole Pool MD (Attending Physician)    Assistant/CV fellow:  William Malik MD      Procedure performed:       [x] Left Heart Catheterization. [] Graft Angiography. [x] Left Ventriculography. [] Right Heart Catheterization. [] Coronary Angiography. [] Aortic Valve Studies. [] PCI:      [] Other:       Pre Procedure Conscious Sedation Data:  ASA Class:    [] I [] II [x] III [] IV    Mallampati Class:  [x] I [] II [] III [] IV      Indication:  - Abnormal stress test   - Pre op risk stratification     Procedure:  Access:  [] Femoral  [x] Radial  artery       [x] Right  [] Left    Procedure: After informed consent was obtained with explanation of the risks and benefits, patient was brought to the cath lab. The access area was prepped and draped in sterile fashion. 1% lidocaine was used for local block. The artery was cannulated with 6  Fr sheath with brisk arterial blood return. The side port was frequently flushed and aspirated with normal saline. Cardiac Arteries and Lesion Findings       LMCA: Normal 0% stenosis. LAD: Normal 0% stenosis. D1: Ostial 50% stenosis, small vessel     LCx: Normal 0% stenosis. Distal area 60-70% stenosis, small vessel. OM1: Large with n o disease     RCA: Normal 0% stenosis. PDA: Good size and free of disease      Coronary Tree        Dominance: Right       LV Analysis   LV function assessed as:Normal.            Procedure Summary        Non obstructive minimal CAD    Preserved LV function        Recommendations        Medical treatments    Clear for the bariatric planned surgery          Estimated Blood Loss: 5  mL        Electronically signed on 6/17/2022 at 1:05 PM by:    William Malik MD  Fellow, [address] [employer] [facility] [Leg Claudication] : intermittent leg claudication [Negative] : Heme/Lymph

## 2022-07-08 ENCOUNTER — APPOINTMENT (OUTPATIENT)
Dept: CARDIOLOGY | Facility: CLINIC | Age: 72
End: 2022-07-08

## 2022-07-08 VITALS
RESPIRATION RATE: 15 BRPM | SYSTOLIC BLOOD PRESSURE: 110 MMHG | WEIGHT: 191 LBS | HEIGHT: 72 IN | TEMPERATURE: 97.6 F | DIASTOLIC BLOOD PRESSURE: 70 MMHG | OXYGEN SATURATION: 98 % | HEART RATE: 71 BPM | BODY MASS INDEX: 25.87 KG/M2

## 2022-07-08 PROCEDURE — 99214 OFFICE O/P EST MOD 30 MIN: CPT

## 2022-07-08 RX ORDER — AMLODIPINE BESYLATE AND BENAZEPRIL HYDROCHLORIDE 10; 40 MG/1; MG/1
10-40 CAPSULE ORAL
Refills: 0 | Status: DISCONTINUED | COMMUNITY
End: 2022-07-08

## 2022-07-09 NOTE — PHYSICAL EXAM
[General Appearance - Well Developed] : well developed [Normal Conjunctiva] : the conjunctiva exhibited no abnormalities [] : no respiratory distress [Heart Rate And Rhythm] : heart rate and rhythm were normal [Abdomen Soft] : soft [Abnormal Walk] : normal gait [Skin Color & Pigmentation] : normal skin color and pigmentation [Oriented To Time, Place, And Person] : oriented to person, place, and time [FreeTextEntry1] : L and R hallux ulceration without any signs of infection

## 2022-07-09 NOTE — HISTORY OF PRESENT ILLNESS
[FreeTextEntry1] : 72 year old male with PMH of DM with advanced neuropathy, HTN and CAD s/p CABG, PAD (s/p stents to R BALTA in 12/2021, L BALTA and LPA in 05/2022) presents for follow up. Reports no pain in LE with activity or at rest, denies swelling. Patient underwent PAD intervention in May 2022 for nonhealing L hallux ulcer. Has chronic improving R hallux ulcer as well for which he underwent intervention in 12/2021\par \par Left LE Arterial duplex 06/2022 >75% L peroneal artery stenosis, LPTA with mild atherosclerosis, patent L BALTA stent without any restenosis\par LEELA w/PVR 06/2022 R LEELA 1.12 and L LEELA 1.11\par Angiography of BLE 05/2022 100% L BALTA stenosis and 80% LPA stenosis s/p stents\par Coronary angio. 08/12/2021 triple vessel disease. Patent graft. GDMT\par NM stress 07/2021 showed reversible Inferolateral infarct\par \par Pt stopped smoking cigarettes and uses electronic cigarettes at present time

## 2022-09-09 ENCOUNTER — APPOINTMENT (OUTPATIENT)
Dept: CARDIOLOGY | Facility: CLINIC | Age: 72
End: 2022-09-09

## 2022-09-09 VITALS
TEMPERATURE: 97.2 F | OXYGEN SATURATION: 98 % | HEIGHT: 72 IN | SYSTOLIC BLOOD PRESSURE: 110 MMHG | DIASTOLIC BLOOD PRESSURE: 68 MMHG | HEART RATE: 52 BPM | WEIGHT: 187 LBS | BODY MASS INDEX: 25.33 KG/M2

## 2022-09-09 PROCEDURE — 99214 OFFICE O/P EST MOD 30 MIN: CPT

## 2022-09-09 NOTE — REVIEW OF SYSTEMS
[Skin Lesions] : skin lesion(s): [Fever] : no fever [Chills] : no chills [Blurry Vision] : no blurred vision [Sore Throat] : no sore throat [SOB] : no shortness of breath [Dyspnea on exertion] : not dyspnea during exertion [Chest Discomfort] : no chest discomfort [Lower Ext Edema] : no extremity edema [Leg Claudication] : no intermittent leg claudication [Palpitations] : no palpitations [Cough] : no cough [Abdominal Pain] : no abdominal pain [Nausea] : no nausea [Vomiting] : no vomiting [Urinary Frequency] : no change in urinary frequency [Dizziness] : no dizziness [Convulsions] : no convulsions [de-identified] : skin lesion on the first metatarsal on the right

## 2022-09-09 NOTE — HISTORY OF PRESENT ILLNESS
[FreeTextEntry1] : 72 year old male with PMH of DM with advanced neuropathy, HTN and CAD s/p CABG, PAD (s/p stents to R BALTA in 12/2021, L BALTA and LPA in 05/2022) presents for follow up. Reports no pain in LE with activity or at rest, denies swelling. Patient underwent PAD intervention in May 2022 for nonhealing L hallux ulcer. Has chronic improving R hallux ulcer as well for which he underwent intervention in 12/2021\par \par Left LE Arterial duplex 06/2022 >75% L peroneal artery stenosis, LPTA with mild atherosclerosis, patent L BALTA stent without any restenosis\par LEELA w/PVR 06/2022 R LEELA 1.12 and L LEELA 1.11\par Angiography of BLE 05/2022 100% L BALTA stenosis and 80% LPA stenosis s/p stents\par Coronary angio. 08/12/2021 triple vessel disease. Patent graft. GDMT\par \par Pt has a new ulcer on the right hallux that started two months ago, saw podiatry who debrided some tissue. \par

## 2022-09-09 NOTE — ASSESSMENT
[FreeTextEntry1] : 72 year old male with PMH of DM with advanced neuropathy, HTN and CAD s/p CABG, PAD (s/p stents to R BALTA in 12/2021, L BALTA and LPA in 05/2022) presents for follow up. Reports no pain in LE with activity or at rest, denies swelling. Patient underwent PAD intervention in May 2022 for nonhealing L hallux ulcer. Has chronic improving R hallux ulcer as well for which he underwent intervention in 12/202. Now presented for new right hallux ulcer\par \par #Left LE Arterial duplex 06/2022 >75% L peroneal artery stenosis, LPTA with mild atherosclerosis, patent L BALTA #stent without any restenosis\par #LEELA w/PVR 06/2022 R LEELA 1.12 and L LEELA 1.11\par #Angiography of BLE 05/2022 100% L BALTA stenosis and 80% LPA stenosis s/p stents\par \par Healing L hallux ulcer without any signs of infection\par Healing R hallux ulcer without any signs of infection\par PAD s/p stents to R BALTA in 12/2021, L BATLA and LPA in 05/2022\par on wheelchair now to prevent any pressure injury to the feet, recommended by podiatry\par \par Plan\par - norvasc was dced due to low blood pressure\par - Continue aspirin, plavix, statins\par - Wear wool socks in cold weather for acrocyanosis, not on norvasc anymore\par - Avoid pressure to plantar surface of big toes\par - good pulses noted on PE\par - fup with podiatry Dr. Sanders\par \par RTC in 2 months. \par

## 2022-09-09 NOTE — PHYSICAL EXAM
[Normal Appearance] : normal appearance [Normal Conjunctiva] : the conjunctiva exhibited no abnormalities [Normal Oral Mucosa] : normal oral mucosa [] : no respiratory distress [Respiration, Rhythm And Depth] : normal respiratory rhythm and effort [Auscultation Breath Sounds / Voice Sounds] : lungs were clear to auscultation bilaterally [Heart Sounds] : normal S1 and S2 [Murmurs] : no murmurs present [Edema] : no peripheral edema present [Bowel Sounds] : normal bowel sounds [Abdomen Tenderness] : non-tender [Nail Clubbing] : no clubbing of the fingernails [0] : left 0 [2+] : left 2+ [FreeTextEntry1] : Skin ulcer on the hallux on the right and left metatarsal of the first toe

## 2022-09-09 NOTE — ADDENDUM
[FreeTextEntry1] : I, , personally performed the evaluation and management (E/M) services for this established patient who presents today with an existing condition(s). That E/M includes conducting the clinically appropriate interval history &/or exam, assessing all new/exacerbated conditions, and establishing a new plan of care. Today, my CLARE, Dr. Mcclellan, was here to observe &/or participate in the visit & follow plan of care established by me.

## 2022-10-18 NOTE — ASU PATIENT PROFILE, ADULT - BLOOD TRANSFUSION, PREVIOUS, PROFILE
At [facility], one important tool we use to improve our patient services is our Patient Survey.  Following your visit you may receive our survey in the mail.    Please take the time to complete the survey.    If your visit with us was great, we want to hear about it.    If we can improve, please let us know how.       Get your Prescription filled at [facility]    [facility] uses the same medical record your doctor uses. More accurate and timely information for your doctor and your pharmacy means more effective and safer health care for you and your family.  [facility] accepts all of the major insurance plans which means you pay the same copay wherever you go.  [facility] take the time to explain your medication regimen to you.  [facility] will mail your medications to you free of postage and handling charges. We love tahir.      
no

## 2022-11-04 ENCOUNTER — APPOINTMENT (OUTPATIENT)
Dept: CARDIOLOGY | Facility: CLINIC | Age: 72
End: 2022-11-04

## 2022-11-04 VITALS
WEIGHT: 180 LBS | OXYGEN SATURATION: 95 % | SYSTOLIC BLOOD PRESSURE: 110 MMHG | RESPIRATION RATE: 16 BRPM | BODY MASS INDEX: 24.38 KG/M2 | HEART RATE: 66 BPM | HEIGHT: 72 IN | TEMPERATURE: 97.4 F | DIASTOLIC BLOOD PRESSURE: 80 MMHG

## 2022-11-04 DIAGNOSIS — E11.42 TYPE 2 DIABETES MELLITUS WITH DIABETIC POLYNEUROPATHY: ICD-10-CM

## 2022-11-04 PROCEDURE — 99214 OFFICE O/P EST MOD 30 MIN: CPT

## 2022-11-04 RX ORDER — ALBUTEROL SULFATE 90 UG/1
108 (90 BASE) INHALANT RESPIRATORY (INHALATION)
Qty: 18 | Refills: 0 | Status: DISCONTINUED | COMMUNITY
Start: 2021-05-13 | End: 2022-11-04

## 2022-11-04 NOTE — PHYSICAL EXAM
[Normal Appearance] : normal appearance [Normal Conjunctiva] : the conjunctiva exhibited no abnormalities [Normal Oral Mucosa] : normal oral mucosa [] : no respiratory distress [Respiration, Rhythm And Depth] : normal respiratory rhythm and effort [Auscultation Breath Sounds / Voice Sounds] : lungs were clear to auscultation bilaterally [Heart Sounds] : normal S1 and S2 [Murmurs] : no murmurs present [Edema] : no peripheral edema present [0] : left 0 [2+] : left 2+ [Bowel Sounds] : normal bowel sounds [Abdomen Tenderness] : non-tender [Nail Clubbing] : no clubbing of the fingernails [FreeTextEntry1] : Skin ulcer on the hallux on the right and left metatarsal of the first toe

## 2022-11-04 NOTE — HISTORY OF PRESENT ILLNESS
[FreeTextEntry1] : 72 year old male with PMH of DM with advanced neuropathy, HTN and CAD s/p CABG, PAD (s/p stents to R BALTA in 12/2021, L BALTA and LPA in 05/2022) presents for follow up. Reports no pain in LE with activity or at rest, denies swelling. Patient underwent PAD intervention in May 2022 for nonhealing L hallux ulcer. Has chronic improving R hallux ulcer as well for which he underwent intervention in 12/2021\par \par Left LE Arterial duplex 06/2022 >75% L peroneal artery stenosis, LPTA with mild atherosclerosis, patent L BALTA stent without any restenosis\par LEELA w/PVR 06/2022 R LEELA 1.12 and L LEELA 1.11\par Angiography of BLE 05/2022 100% L BALTA stenosis and 80% LPA stenosis s/p stents\par Coronary angio. 08/12/2021 triple vessel disease. Patent graft. GDMT\par \par Pt reports L hallux ulcer and R plantar ulcer are not improving. Pt reports poor quality of life, currently using wheelchair without improvement of ulcer

## 2022-11-04 NOTE — REVIEW OF SYSTEMS
[Skin Lesions] : skin lesion(s): [Fever] : no fever [Chills] : no chills [Blurry Vision] : no blurred vision [Sore Throat] : no sore throat [SOB] : no shortness of breath [Dyspnea on exertion] : not dyspnea during exertion [Chest Discomfort] : no chest discomfort [Lower Ext Edema] : no extremity edema [Leg Claudication] : no intermittent leg claudication [Palpitations] : no palpitations [Cough] : no cough [Abdominal Pain] : no abdominal pain [Nausea] : no nausea [Vomiting] : no vomiting [Urinary Frequency] : no change in urinary frequency [Dizziness] : no dizziness [Convulsions] : no convulsions [de-identified] : skin lesion on the first metatarsal on the right

## 2022-11-04 NOTE — ASSESSMENT
[FreeTextEntry1] : 72 year old male with PMH of DM with advanced neuropathy, HTN and CAD s/p CABG, PAD (s/p stents to R BALTA in 12/2021, L BALTA and LPA in 05/2022) presents for follow up. Reports no pain in LE with activity or at rest, denies swelling. Patient underwent PAD intervention in May 2022 for nonhealing L hallux ulcer. Has chronic improving R hallux ulcer as well for which he underwent intervention in 12/202. Now presented for new right hallux ulcer\par \par #Left LE Arterial duplex 06/2022 >75% L peroneal artery stenosis, LPTA with mild atherosclerosis, patent L BALTA #stent without any restenosis\par #LEELA w/PVR 06/2022 R LEELA 1.12 and L LEELA 1.11\par #Angiography of BLE 05/2022 100% L BALTA stenosis and 80% LPA stenosis s/p stents\par \par  L hallux ulcer without any signs of infection\par  R first metatarsal ulcer without any signs of infection\par PAD s/p stents to R BALTA in 12/2021, L BALTA and LPA in 05/2022\par on wheelchair now to prevent any pressure injury to the feet, recommended by podiatry\par \par Plan\par - Will repeat Vacular studies\par - Follow up with Podiatry regularly\par - Glycemic control with PMD\par - Cont Aspirin, plavix\par \par RTC in 1 months. \par

## 2022-11-11 ENCOUNTER — APPOINTMENT (OUTPATIENT)
Dept: CARDIOLOGY | Facility: CLINIC | Age: 72
End: 2022-11-11

## 2022-11-11 PROCEDURE — 93925 LOWER EXTREMITY STUDY: CPT

## 2022-11-11 PROCEDURE — 93923 UPR/LXTR ART STDY 3+ LVLS: CPT

## 2022-12-09 ENCOUNTER — APPOINTMENT (OUTPATIENT)
Dept: CARDIOLOGY | Facility: CLINIC | Age: 72
End: 2022-12-09

## 2022-12-09 VITALS
SYSTOLIC BLOOD PRESSURE: 124 MMHG | OXYGEN SATURATION: 98 % | DIASTOLIC BLOOD PRESSURE: 70 MMHG | HEART RATE: 68 BPM | HEIGHT: 72 IN | WEIGHT: 182 LBS | BODY MASS INDEX: 24.65 KG/M2

## 2022-12-09 PROCEDURE — 99214 OFFICE O/P EST MOD 30 MIN: CPT

## 2023-01-27 ENCOUNTER — APPOINTMENT (OUTPATIENT)
Dept: CARDIOLOGY | Facility: CLINIC | Age: 73
End: 2023-01-27
Payer: MEDICARE

## 2023-01-27 ENCOUNTER — NON-APPOINTMENT (OUTPATIENT)
Age: 73
End: 2023-01-27

## 2023-01-27 VITALS
SYSTOLIC BLOOD PRESSURE: 124 MMHG | HEART RATE: 67 BPM | OXYGEN SATURATION: 98 % | BODY MASS INDEX: 24.79 KG/M2 | WEIGHT: 183 LBS | HEIGHT: 72 IN | DIASTOLIC BLOOD PRESSURE: 72 MMHG

## 2023-01-27 PROCEDURE — 99214 OFFICE O/P EST MOD 30 MIN: CPT

## 2023-01-27 NOTE — ASSESSMENT
[FreeTextEntry1] : 72 year old male with PMH of DM with advanced neuropathy, HTN and CAD s/p CABG, PAD (s/p stents to R BALTA in 12/2021, L BALTA and LPA in 05/2022) presents for follow up.  Has chronic improving R hallux ulcer as well for which he underwent intervention in 12/202. Now presented for new right hallux ulcer\par \par #PAD\par - Left LE Arterial duplex 11/2022: >75% stenosis L distal anterior tibial artery stenosis, , patent L BALTA stent without any restenosis\par - LEELA w/ PVR 11/2022 R LEELA 1.15 and L LEELA 1.13\par - Angiography of BLE 05/2022 100% L BALTA stenosis and 80% LPA stenosis s/p stents\par - L hallux ulcer without any signs of infection\par - R first metatarsal ulcer without any signs of infection\par - s/p stents to R BALTA in 12/2021, L BALTA and LPA in 05/2022\par on wheelchair now to prevent any pressure injury to the feet, recommended by podiatry\par \par Plan\par - Follow up with Podiatry regularly\par - Glycemic control with PMD\par - Cont Aspirin, plavix\par \par RTC in 6 weeks \par

## 2023-01-27 NOTE — HISTORY OF PRESENT ILLNESS
[FreeTextEntry1] : 72 year old male with PMH of DM with advanced neuropathy, HTN and CAD s/p CABG, PAD (s/p stents to R BALTA in 12/2021, L BALTA and LPA in 05/2022) presents for follow up. Reports no pain in LE with activity or at rest. Patient states that his feet are mostly numb.  denies swelling. Patient underwent PAD intervention in May 2022 for nonhealing L hallux ulcer.  R hallux ulcer is now healed  for which he underwent intervention in 12/2021\par \par Left LE Arterial duplex 11/2022 >75% L distal BALTA  stenosis, patent L BALTA stent without any restenosis\par LEELA w/ PVR 11/2022 R LEELA 1.15 and L LEELA 1.13\par Angiography of BLE 05/2022 100% L BALTA stenosis and 80% LPA stenosis s/p stents\par Coronary angio. 08/12/2021 triple vessel disease. Patent graft. GDMT\par \par Pt reports L hallux ulcer and R first metatarsal ulcer without any signs of infection and are improving.

## 2023-01-27 NOTE — REVIEW OF SYSTEMS
[Fever] : no fever [Blurry Vision] : no blurred vision [Earache] : no earache [Sore Throat] : no sore throat [SOB] : no shortness of breath [Dyspnea on exertion] : not dyspnea during exertion [Chest Discomfort] : no chest discomfort [Lower Ext Edema] : no extremity edema [Palpitations] : no palpitations [Syncope] : no syncope [Cough] : no cough [Wheezing] : no wheezing [Abdominal Pain] : no abdominal pain [Nausea] : no nausea [Vomiting] : no vomiting [Urinary Frequency] : no change in urinary frequency [Joint Pain] : no joint pain [Joint Swelling] : no joint swelling [Rash] : no rash [Dizziness] : no dizziness

## 2023-01-27 NOTE — REVIEW OF SYSTEMS
[Skin Lesions] : skin lesion(s): [Fever] : no fever [Chills] : no chills [Blurry Vision] : no blurred vision [Sore Throat] : no sore throat [SOB] : no shortness of breath [Dyspnea on exertion] : not dyspnea during exertion [Chest Discomfort] : no chest discomfort [Lower Ext Edema] : no extremity edema [Leg Claudication] : no intermittent leg claudication [Palpitations] : no palpitations [Cough] : no cough [Abdominal Pain] : no abdominal pain [Nausea] : no nausea [Vomiting] : no vomiting [Urinary Frequency] : no change in urinary frequency [Dizziness] : no dizziness [Convulsions] : no convulsions [de-identified] : skin lesion on the first metatarsal on the right

## 2023-01-27 NOTE — HISTORY OF PRESENT ILLNESS
[FreeTextEntry1] : 72 year old male with PMH of DM with advanced neuropathy, HTN and CAD s/p CABG, PAD (s/p stents to R BALTA in 12/2021, L BALTA and LPA in 05/2022) presents for follow up. Reports no pain in LE with activity or at rest. Patient states that his feet are mostly numb. denies swelling. Patient underwent PAD intervention in May 2022 for nonhealing L hallux ulcer. R hallux ulcer is now healed for which he underwent intervention in 12/2021\par \par Left LE Arterial duplex 11/2022 >75% L distal BALTA stenosis, patent L BALTA stent without any restenosis\par LEELA w/ PVR 11/2022 R LEELA 1.15 and L LEELA 1.13\par Angiography of BLE 05/2022 100% L BALTA stenosis and 80% LPA stenosis s/p stents\par Coronary angio. 08/12/2021 triple vessel disease. Patent graft. GDMT\par \par Pt reports L hallux ulcer and R first metatarsal ulcer without any signs of infection and are improving, left foot ulcer slower than right, right is almost healed. \par \par Pt has cut down even on his e cig and is hoping to quit by the next time he comes for his appointment.

## 2023-01-27 NOTE — PHYSICAL EXAM
[Normal Oral Mucosa] : normal oral mucosa [Heart Sounds] : normal S1 and S2 [Murmurs] : no murmurs present [Edema] : no peripheral edema present [Nail Clubbing] : no clubbing of the fingernails [General Appearance - Well Developed] : well developed [Normal Appearance] : normal appearance [General Appearance - Well Nourished] : well nourished [Normal Conjunctiva] : the conjunctiva exhibited no abnormalities [Respiration, Rhythm And Depth] : normal respiratory rhythm and effort [Exaggerated Use Of Accessory Muscles For Inspiration] : no accessory muscle use [Auscultation Breath Sounds / Voice Sounds] : lungs were clear to auscultation bilaterally [Chest Palpation] : palpation of the chest revealed no abnormalities [Normal] : normal [No Precordial Heave] : no precordial heave was noted [Normal Rate] : normal [Rhythm Regular] : regular [Normal S1] : normal S1 [Normal S2] : normal S2 [No Murmur] : no murmurs heard [2+] : left 2+ [1+] : left 1+ [0] : left 0 [No Pitting Edema] : no pitting edema present [Rt] : varicose veins of the right leg noted [Lt] : varicose veins of the left leg noted [Bowel Sounds] : normal bowel sounds [Abdomen Soft] : soft [Abdomen Tenderness] : non-tender [] : no hepato-splenomegaly [Oriented To Time, Place, And Person] : oriented to person, place, and time [FreeTextEntry1] : Skin ulcer on the hallux on the left and right metatarsal

## 2023-01-27 NOTE — ASSESSMENT
[FreeTextEntry1] : 72 year old male with PMH of DM with advanced neuropathy, HTN and CAD s/p CABG, PAD (s/p stents to R BALTA in 12/2021, L BALTA and LPA in 05/2022) presents for follow up. Has chronic improving R hallux ulcer as well for which he underwent intervention in 12/202. Now presented for new right hallux ulcer\par \par #PAD s/p multiple interventions\par - Left LE Arterial duplex 11/2022: >75% stenosis L distal anterior tibial artery stenosis, , patent L BALTA stent without any restenosis\par - LEELA w/ PVR 11/2022 R LEELA 1.15 and L LEELA 1.13\par - Angiography of BLE 05/2022 100% L BALTA stenosis and 80% LPA stenosis s/p stents\par - L hallux ulcer without any signs of infection\par - R first metatarsal ulcer without any signs of infection\par - s/p stents to R BALTA in 12/2021, L BALTA and LPA in 05/2022\par on wheelchair now to prevent any pressure injury to the feet, recommended by podiatry\par - is walking around very little while being careful\par - ulcers improving, right one almost closed.\par \par Plan\par - Follow up with Podiatry regularly\par - Cont Aspirin, plavix\par \par RTC in 3 months\par \par \par I, , personally performed the evaluation and management (E/M) services for this established patient who presents today with (a) new problem(s)/exacerbation of (an) existing condition(s). That E/M includes conducting the clinically appropriate interval history &/or exam, assessing all new/exacerbated conditions, and establishing a new plan of care. Today, my CLARE, Dr. Mcclellan, was here to observe &/or participate in the visit & follow plan of care established by me.\par \par

## 2023-04-04 RX ORDER — NITROGLYCERIN 0.4 MG/1
0.4 TABLET SUBLINGUAL
Qty: 25 | Refills: 3 | Status: ACTIVE | COMMUNITY
Start: 2023-04-04 | End: 1900-01-01

## 2023-04-19 ENCOUNTER — OUTPATIENT (OUTPATIENT)
Dept: INPATIENT UNIT | Facility: HOSPITAL | Age: 73
LOS: 1 days | Discharge: HOME | End: 2023-04-19
Payer: MEDICARE

## 2023-04-19 VITALS
SYSTOLIC BLOOD PRESSURE: 117 MMHG | WEIGHT: 182.1 LBS | HEIGHT: 72 IN | DIASTOLIC BLOOD PRESSURE: 60 MMHG | HEART RATE: 67 BPM | RESPIRATION RATE: 12 BRPM | OXYGEN SATURATION: 99 %

## 2023-04-19 DIAGNOSIS — Z86.69 PERSONAL HISTORY OF OTHER DISEASES OF THE NERVOUS SYSTEM AND SENSE ORGANS: Chronic | ICD-10-CM

## 2023-04-19 DIAGNOSIS — Z95.1 PRESENCE OF AORTOCORONARY BYPASS GRAFT: Chronic | ICD-10-CM

## 2023-04-19 DIAGNOSIS — I25.10 ATHEROSCLEROTIC HEART DISEASE OF NATIVE CORONARY ARTERY WITHOUT ANGINA PECTORIS: ICD-10-CM

## 2023-04-19 LAB
ANION GAP SERPL CALC-SCNC: 11 MMOL/L — SIGNIFICANT CHANGE UP (ref 7–14)
APTT BLD: 35.9 SEC — SIGNIFICANT CHANGE UP (ref 27–39.2)
BUN SERPL-MCNC: 37 MG/DL — HIGH (ref 10–20)
CALCIUM SERPL-MCNC: 9.7 MG/DL — SIGNIFICANT CHANGE UP (ref 8.4–10.5)
CHLORIDE SERPL-SCNC: 109 MMOL/L — SIGNIFICANT CHANGE UP (ref 98–110)
CO2 SERPL-SCNC: 22 MMOL/L — SIGNIFICANT CHANGE UP (ref 17–32)
CREAT SERPL-MCNC: 1.2 MG/DL — SIGNIFICANT CHANGE UP (ref 0.7–1.5)
EGFR: 63 ML/MIN/1.73M2 — SIGNIFICANT CHANGE UP
GLUCOSE SERPL-MCNC: 124 MG/DL — HIGH (ref 70–99)
HCT VFR BLD CALC: 33.4 % — LOW (ref 42–52)
HGB BLD-MCNC: 10.4 G/DL — LOW (ref 14–18)
INR BLD: 1.03 RATIO — SIGNIFICANT CHANGE UP (ref 0.65–1.3)
MCHC RBC-ENTMCNC: 24.7 PG — LOW (ref 27–31)
MCHC RBC-ENTMCNC: 31.1 G/DL — LOW (ref 32–37)
MCV RBC AUTO: 79.3 FL — LOW (ref 80–94)
NRBC # BLD: 0 /100 WBCS — SIGNIFICANT CHANGE UP (ref 0–0)
PLATELET # BLD AUTO: 205 K/UL — SIGNIFICANT CHANGE UP (ref 130–400)
PMV BLD: 11 FL — HIGH (ref 7.4–10.4)
POTASSIUM SERPL-MCNC: 4.3 MMOL/L — SIGNIFICANT CHANGE UP (ref 3.5–5)
POTASSIUM SERPL-SCNC: 4.3 MMOL/L — SIGNIFICANT CHANGE UP (ref 3.5–5)
PROTHROM AB SERPL-ACNC: 11.8 SEC — SIGNIFICANT CHANGE UP (ref 9.95–12.87)
RBC # BLD: 4.21 M/UL — LOW (ref 4.7–6.1)
RBC # FLD: 17.8 % — HIGH (ref 11.5–14.5)
SODIUM SERPL-SCNC: 142 MMOL/L — SIGNIFICANT CHANGE UP (ref 135–146)
WBC # BLD: 6.6 K/UL — SIGNIFICANT CHANGE UP (ref 4.8–10.8)
WBC # FLD AUTO: 6.6 K/UL — SIGNIFICANT CHANGE UP (ref 4.8–10.8)

## 2023-04-19 PROCEDURE — C1760: CPT

## 2023-04-19 PROCEDURE — 85027 COMPLETE CBC AUTOMATED: CPT

## 2023-04-19 PROCEDURE — 80048 BASIC METABOLIC PNL TOTAL CA: CPT

## 2023-04-19 PROCEDURE — 36415 COLL VENOUS BLD VENIPUNCTURE: CPT

## 2023-04-19 PROCEDURE — 93459 L HRT ART/GRFT ANGIO: CPT

## 2023-04-19 PROCEDURE — 85730 THROMBOPLASTIN TIME PARTIAL: CPT

## 2023-04-19 PROCEDURE — 85610 PROTHROMBIN TIME: CPT

## 2023-04-19 PROCEDURE — C1769: CPT

## 2023-04-19 PROCEDURE — C1894: CPT

## 2023-04-19 PROCEDURE — C1887: CPT

## 2023-04-19 RX ORDER — OXYCODONE HYDROCHLORIDE 5 MG/1
1 TABLET ORAL
Qty: 0 | Refills: 0 | DISCHARGE

## 2023-04-19 RX ORDER — OMEPRAZOLE 10 MG/1
1 CAPSULE, DELAYED RELEASE ORAL
Qty: 0 | Refills: 0 | DISCHARGE

## 2023-04-19 RX ORDER — ROSUVASTATIN CALCIUM 5 MG/1
1 TABLET ORAL
Qty: 0 | Refills: 0 | DISCHARGE

## 2023-04-19 RX ORDER — TAMSULOSIN HYDROCHLORIDE 0.4 MG/1
1 CAPSULE ORAL
Qty: 0 | Refills: 0 | DISCHARGE

## 2023-04-19 RX ORDER — POLYETHYLENE GLYCOL 3350 17 G/17G
17 POWDER, FOR SOLUTION ORAL
Refills: 0 | DISCHARGE

## 2023-04-19 RX ORDER — DULOXETINE HYDROCHLORIDE 30 MG/1
1 CAPSULE, DELAYED RELEASE ORAL
Qty: 0 | Refills: 0 | DISCHARGE

## 2023-04-19 RX ORDER — CLOPIDOGREL BISULFATE 75 MG/1
1 TABLET, FILM COATED ORAL
Qty: 0 | Refills: 0 | DISCHARGE

## 2023-04-19 RX ORDER — ASPIRIN/CALCIUM CARB/MAGNESIUM 324 MG
1 TABLET ORAL
Qty: 0 | Refills: 0 | DISCHARGE

## 2023-04-19 RX ORDER — RANOLAZINE 500 MG/1
1 TABLET, FILM COATED, EXTENDED RELEASE ORAL
Qty: 0 | Refills: 0 | DISCHARGE

## 2023-04-19 RX ORDER — OXYBUTYNIN CHLORIDE 5 MG
1 TABLET ORAL
Qty: 0 | Refills: 0 | DISCHARGE

## 2023-04-19 RX ORDER — NALOXEGOL OXALATE 12.5 MG/1
1 TABLET, FILM COATED ORAL
Qty: 0 | Refills: 0 | DISCHARGE

## 2023-04-19 NOTE — H&P CARDIOLOGY - HISTORY OF PRESENT ILLNESS
72 year old male with PMH of DM with advanced neuropathy, HTN and CAD s/p CABG, PAD (s/p stents to R BALTA in 12/2021, L BALTA and LPA in 05/2022) presents for [facility] due to HUDSON and CP.  Reports no pain in LE with activity or at rest. Patient states that his feet are mostly numb. denies swelling. Patient underwent PAD intervention in May 2022 for nonhealing L hallux ulcer. R hallux ulcer is now healed for which he underwent intervention in 12/2021    Left LE Arterial duplex 11/2022 >75% L distal BALTA stenosis, patent L BALTA stent without any restenosis  LEELA w/ PVR 11/2022 R LEELA 1.15 and L LEELA 1.13  Angiography of BLE 05/2022 100% L BALTA stenosis and 80% LPA stenosis s/p stents  Coronary angio. 08/12/2021 triple vessel disease. Patent graft. GDMT    Pt reports L hallux ulcer and R first metatarsal ulcer without any signs of infection and are improving, left foot ulcer slower than right, right is almost healed.   Pt has cut down even on his e cig and is hoping to quit by the next time he comes for his appointment.      Active Problems  Acrocyanosis (443.89) (I73.89)  Angina pectoris (413.9) (I20.9)  At risk for coronary artery disease (V49.89) (Z91.89)  Diabetic peripheral neuropathy (250.60,357.2) (E11.42)  Hyperlipidemia (272.4) (E78.5)  Hypertension, unspecified type (401.9) (I10)  PAD (peripheral artery disease) (443.9) (I73.9)  Ulcer of right foot, unspecified ulcer stage (707.15) (L97.519)    Past Medical History    History of peripheral arterial disease (V12.59) (Z86.79)    Allergies    Pollen    Pre cath note:    indication:  [ ] STEMI                [ ] NSTEMI                 [ ] Acute coronary syndrome                         [ ]Unstable Angina   [ ] high risk  [ ] intermediate risk  [ ] low risk                         [x ] Stable Angina     non-invasive testing: NST                         Date:  7/2021                   result: [ ] high risk  [x ] intermediate risk  [ ] low risk    Anti- Anginal medications:                        [ ] not used                       [x ] used                   [ ] not used but strong indication not to use    Ejection Fraction                       [ ] <29            [ ] 30-39%   [ ] 40-49%     [x ]>50%    CHF                       [ ] active (within last 14 days on meds   [ ] Chronic (on meds but no exacerbation)    COPD                        [x ] mild (on chronic bronchodilators)  [ ] moderate (on chronic steroid therapy)      [ ] severe (indication for home O2 or PACO2 >50)    Other risk factors:                         [x ] Previous MI                       [ ] CVA/ stroke                      [ ] carotid stent/ CEA                      [x ] PVD/PAD- (arterial aneurysm, non-palpable pulses, tortuous vessel with inability to insert catheter, infra-renal dissection, renal or subclavian artery stenosis)                      [ x] diabetic                      [x ] previous CABG                      [ ] Renal Failure     Adjusted CathPCI Bleeding Event Risk: 1.8%    RIGHT RADIAL ARTERY EVALUATION:  ASHLEY TEST: [ ] Negative          [x ] Positive    IVF: 250cc NS bolus pre-cath hydration [x ]

## 2023-04-19 NOTE — ASU PATIENT PROFILE, ADULT - NSICDXPASTSURGICALHX_GEN_ALL_CORE_FT
PAST SURGICAL HISTORY:  H/O carpal tunnel syndrome Knee Surgery    S/P CABG x 2 7/2003 @ [facility](LIMA-LAD, SVG to RCA)

## 2023-04-19 NOTE — CHART NOTE - NSCHARTNOTEFT_GEN_A_CORE
PRE-OP DIAGNOSIS:    stable angina    PROCEDURE:     [x] Coronary Angiogram     [x] LHC     [] LVG     [] RHC     [] Intervention (see below)         PHYSICIAN:  Dr. Rosario    ASSISTANT:  Dr. Manning     PROCEDURE DESCRIPTION:     Consent:      [x] Patient     [] Family Member     []  Used        Anesthesia:     [] General     [x] Sedation     [x] Local        Access & Closure:     [] Fr Radial Artery     [x] 6 Fr right Femoral Artery (Perclose)    [] Fr Femoral Vein     [] Fr Brachial Vein       IV Contrast: 100 mL        Intervention:  none    Implants: none       FINDINGS:     Coronary Dominance: left dominant      LM: mild disease    LAD: 100% stenosis () of mid LAD    CX: 100% ostial stenosis ()  OM1: severe disease, supplied by patent SVG to OM  LPL: moderate disease, fills retrograde via SVG to OM    RCA: 100% prox RCA occlusion ()    LIMA to LAD and Diag is patent    SVG to OM1 is patent       LVEDP: 20 mmHg            ESTIMATED BLOOD LOSS: < 10 mL        CONDITION:     [x] Good     [] Fair     [] Critical        SPECIMEN REMOVED: N/A       POST-OP DIAGNOSIS:      [] Normal Coronary Angiogram     [] Mild Coronary Artery Disease (< 50% stenosis)     [x] 1 Vessel Coronary Artery Disease (100% stenosis of RCA, patent LIMA to LAD, and patent SVG to OM       PLAN OF CARE:     [x] D/C Home Today     [x] Medications:   - continue with ASA, plavix, statin, ranexa, imdur, and lisinopril    [x] IV Fluids: NS@100cc/hr x 4 hours

## 2023-04-27 DIAGNOSIS — I25.82 CHRONIC TOTAL OCCLUSION OF CORONARY ARTERY: ICD-10-CM

## 2023-04-27 DIAGNOSIS — I25.118 ATHEROSCLEROTIC HEART DISEASE OF NATIVE CORONARY ARTERY WITH OTHER FORMS OF ANGINA PECTORIS: ICD-10-CM

## 2023-04-27 DIAGNOSIS — Z95.1 PRESENCE OF AORTOCORONARY BYPASS GRAFT: ICD-10-CM

## 2023-04-28 ENCOUNTER — APPOINTMENT (OUTPATIENT)
Dept: CARDIOLOGY | Facility: CLINIC | Age: 73
End: 2023-04-28
Payer: MEDICARE

## 2023-04-28 VITALS
BODY MASS INDEX: 26.41 KG/M2 | OXYGEN SATURATION: 96 % | DIASTOLIC BLOOD PRESSURE: 78 MMHG | HEIGHT: 72 IN | SYSTOLIC BLOOD PRESSURE: 138 MMHG | HEART RATE: 59 BPM | WEIGHT: 195 LBS

## 2023-04-28 DIAGNOSIS — E78.5 HYPERLIPIDEMIA, UNSPECIFIED: ICD-10-CM

## 2023-04-28 DIAGNOSIS — L97.519 NON-PRESSURE CHRONIC ULCER OF OTHER PART OF RIGHT FOOT WITH UNSPECIFIED SEVERITY: ICD-10-CM

## 2023-04-28 DIAGNOSIS — I73.9 PERIPHERAL VASCULAR DISEASE, UNSPECIFIED: ICD-10-CM

## 2023-04-28 DIAGNOSIS — I10 ESSENTIAL (PRIMARY) HYPERTENSION: ICD-10-CM

## 2023-04-28 PROCEDURE — 99214 OFFICE O/P EST MOD 30 MIN: CPT

## 2023-04-28 NOTE — PHYSICAL EXAM
[Well Developed] : well developed [Well Nourished] : well nourished [No Acute Distress] : no acute distress [Normal Conjunctiva] : normal conjunctiva [Normal Venous Pressure] : normal venous pressure [No Carotid Bruit] : no carotid bruit [Normal S1, S2] : normal S1, S2 [No Murmur] : no murmur [No Gallop] : no gallop [No Rub] : no rub [Clear Lung Fields] : clear lung fields [Good Air Entry] : good air entry [No Respiratory Distress] : no respiratory distress  [Soft] : abdomen soft [Non Tender] : non-tender [No Masses/organomegaly] : no masses/organomegaly [Normal Bowel Sounds] : normal bowel sounds [Normal Gait] : normal gait [No Edema] : no edema [No Varicosities] : no varicosities [No Clubbing] : no clubbing [Normal PT B/L] : normal PT B/L [Normal DP B/L] : normal DP B/L [No Rash] : no rash [No Skin Lesions] : no skin lesions [Moves all extremities] : moves all extremities [No Focal Deficits] : no focal deficits [Normal Speech] : normal speech [Alert and Oriented] : alert and oriented [Normal memory] : normal memory [de-identified] : rubor of toes b/l. 1cm diameter healing ulcer on base of L hallux.

## 2023-04-28 NOTE — ASSESSMENT
[FreeTextEntry1] : 72 year old male with PMH of DM with advanced neuropathy, HTN and CAD s/p CABG, PAD (s/p stents to R BALTA in 12/2021, L BALTA and LPA in 05/2022) presents for follow up. Has chronic improving R hallux ulcer as well for which he underwent intervention in 12/202. Now presentin for follow up for healing right hallux ulcer\par \par #PAD s/p multiple interventions\par - Left LE Arterial duplex 11/2022: >75% stenosis L distal anterior tibial artery stenosis, , patent L BALTA stent without any restenosis\par - LEELA w/ PVR 11/2022 R LEELA 1.15 and L LEELA 1.13\par - Angiography of BLE 05/2022 100% L BALTA stenosis and 80% LPA stenosis s/p stents\par - L hallux ulcer without any signs of infection\par - R first metatarsal ulcer without any signs of infection\par - s/p stents to R BALTA in 12/2021, L BALTA and LPA in 05/2022\par - is walking around now\par - ulcers improving, right one almost closed.\par \par Plan\par - Follow up with Podiatry regularly\par - Cont ASA, plavix\par - e cigarette cessation\par - no need to RTC at this point as wounds nearly healed\par \par I, , personally performed the evaluation and management (E/M) services for this established patient who presents today with (a) new problem(s)/exacerbation of (an) existing condition(s). That E/M includes conducting the clinically appropriate interval history &/or exam, assessing all new/exacerbated conditions, and establishing a new plan of care. Today, my CLARE, Dr. Buchanan, was here to observe &/or participate in the visit & follow plan of care established by me.\par \par

## 2023-04-28 NOTE — REASON FOR VISIT
[FreeTextEntry1] : 73 year old male with PMH of DM with advanced neuropathy, HTN and CAD s/p CABG, PAD (s/p stents to R BALTA in 12/2021, L BALTA and LPA in 05/2022) presents for follow up. Reports no pain in LE with activity or at rest. Patient states that his feet are mostly numb. denies swelling. Patient underwent PAD intervention in May 2022 for nonhealing L hallux ulcer. R hallux ulcer is now healed for which he underwent intervention in 12/2021\par \par Left LE Arterial duplex 11/2022 >75% L distal BALTA stenosis, patent L BALTA stent without any restenosis\par LEELA w/ PVR 11/2022 R LEELA 1.15 and L LEELA 1.13\par Angiography of BLE 05/2022 100% L BALTA stenosis and 80% LPA stenosis s/p stents\par Coronary angio. 08/12/2021 triple vessel disease. Patent graft. GDMT\par \par Pt reports L hallux ulcer and R first metatarsal ulcer without any signs of infection and are improving, left foot ulcer slower than right, right is almost healed. \par \par Pt has cut down on his e cig and is hoping to quit.\par  \par

## 2023-11-01 NOTE — DISCHARGE NOTE NURSING/CASE MANAGEMENT/SOCIAL WORK - NURSING SECTION COMPLETE
Is This A New Presentation, Or A Follow-Up?: Rash
Patient/Caregiver provided printed discharge information.
06-Jul-2021

## 2023-12-15 NOTE — ASU PATIENT PROFILE, ADULT - PACKS YRS CALCULATION
Telephone Visit Note  This visit is being performed virtually.  Clinician Location: [facility]  Heath's Location: Home  Heath is in [place] and patient's identity has been verified.    Consent to treat includes permission to submit charges to the patient's insurance. It was also shared that without being seen and evaluated in person, there is a risk that the information and/or assessment may be incomplete or inaccurate.  11-20 minutes were spent in this encounter.    HISTORY OF PRESENT ILLNESS  Overview  Trend Vitals        Heath Rivera is a pleasant [phone]

## 2024-01-23 NOTE — ASU PATIENT PROFILE, ADULT - FALL HARM RISK - HARM RISK INTERVENTIONS
Universal Safety Interventions
Assistance with ambulation/Assistance OOB with selected safe patient handling equipment/Communicate Risk of Fall with Harm to all staff/Monitor gait and stability/Reinforce activity limits and safety measures with patient and family/Tailored Fall Risk Interventions/Visual Cue: Yellow wristband and red socks/Bed in lowest position, wheels locked, appropriate side rails in place/Call bell, personal items and telephone in reach/Instruct patient to call for assistance before getting out of bed or chair/Non-slip footwear when patient is out of bed/[place] to call system/Physically safe environment - no spills, clutter or unnecessary equipment/Purposeful Proactive Rounding/Room/bathroom lighting operational, light cord in reach

## 2024-02-06 NOTE — DISCHARGE NOTE NURSING/CASE MANAGEMENT/SOCIAL WORK - WILL THE PATIENT ACCEPT THE PFIZER COVID-19 VACCINE IF ELIGIBLE AND IT IS AVAILABLE?
Caller:       MARBIN LOBO [phone] Remove     [phone] FAX   MATT SR       What form or medical record are you requesting: NEEDING ANY INFORMATION REGARDING THE CYST - FOR THE REFERRAL TO THEIR OFFICE      Who is requesting this form or medical record from you: DR ESPARZA     How would you like to receive the form or medical records (pick-up, mail, fax):   If fax, what is the fax number: [phone]    Timeframe paperwork needed: ASAP     Additional notes: PATIENT HAS APPT TODAY REGARDING THE ADRENAL CYST    Not applicable
